# Patient Record
Sex: FEMALE | Race: WHITE | NOT HISPANIC OR LATINO | Employment: FULL TIME | URBAN - METROPOLITAN AREA
[De-identification: names, ages, dates, MRNs, and addresses within clinical notes are randomized per-mention and may not be internally consistent; named-entity substitution may affect disease eponyms.]

---

## 2023-09-15 PROBLEM — G45.9 TIA (TRANSIENT ISCHEMIC ATTACK): Status: ACTIVE | Noted: 2023-09-15

## 2023-09-15 PROBLEM — R53.83 FATIGUE: Status: ACTIVE | Noted: 2023-09-15

## 2023-09-15 PROBLEM — R07.89 CHEST PAIN, ATYPICAL: Status: ACTIVE | Noted: 2023-09-15

## 2023-09-15 PROBLEM — M79.89 LEG SWELLING: Status: ACTIVE | Noted: 2023-09-15

## 2023-09-15 PROBLEM — I25.10 CAD (CORONARY ARTERY DISEASE): Status: ACTIVE | Noted: 2023-09-15

## 2023-09-15 RX ORDER — ASPIRIN 81 MG/1
81 TABLET ORAL DAILY
COMMUNITY

## 2023-09-15 RX ORDER — VALACYCLOVIR HYDROCHLORIDE 500 MG/1
500 TABLET, FILM COATED ORAL
COMMUNITY
End: 2024-04-23 | Stop reason: WASHOUT

## 2023-09-15 RX ORDER — ATORVASTATIN CALCIUM 40 MG/1
40 TABLET, FILM COATED ORAL NIGHTLY
COMMUNITY
End: 2024-04-23 | Stop reason: WASHOUT

## 2023-09-15 RX ORDER — TRAZODONE HYDROCHLORIDE 100 MG/1
100 TABLET ORAL NIGHTLY PRN
COMMUNITY
End: 2024-02-26 | Stop reason: ENTERED-IN-ERROR

## 2023-10-14 PROBLEM — R63.5 WEIGHT GAIN: Status: ACTIVE | Noted: 2023-10-14

## 2023-10-14 PROBLEM — M25.50 JOINT PAIN: Status: ACTIVE | Noted: 2023-10-14

## 2023-10-14 PROBLEM — M48.00 SPINAL STENOSIS: Status: ACTIVE | Noted: 2023-10-14

## 2023-10-14 PROBLEM — T07.XXXA FRACTURES: Status: ACTIVE | Noted: 2023-10-14

## 2023-10-14 PROBLEM — R26.2 DIFFICULTY WALKING: Status: ACTIVE | Noted: 2023-10-14

## 2023-10-14 PROBLEM — M62.81 GENERALIZED MUSCLE WEAKNESS: Status: ACTIVE | Noted: 2023-10-14

## 2023-10-14 PROBLEM — M47.816 DEGENERATIVE JOINT DISEASE (DJD) OF LUMBAR SPINE: Status: ACTIVE | Noted: 2023-10-14

## 2023-10-14 PROBLEM — R53.83 FATIGUE: Status: ACTIVE | Noted: 2023-10-14

## 2023-10-14 PROBLEM — M25.512 LEFT SHOULDER PAIN: Status: ACTIVE | Noted: 2023-10-14

## 2023-10-14 PROBLEM — M79.89 LEG SWELLING: Status: ACTIVE | Noted: 2023-10-14

## 2023-10-14 PROBLEM — R20.0 NUMBNESS: Status: ACTIVE | Noted: 2023-10-14

## 2023-10-14 PROBLEM — S39.012A LUMBAR STRAIN: Status: ACTIVE | Noted: 2023-10-14

## 2023-10-14 RX ORDER — DICLOFENAC POTASSIUM 50 MG/1
TABLET, FILM COATED ORAL
COMMUNITY
Start: 2016-01-26 | End: 2023-10-16

## 2023-10-14 RX ORDER — TRAZODONE HYDROCHLORIDE 150 MG/1
TABLET ORAL
COMMUNITY
Start: 2016-01-20 | End: 2023-10-16

## 2023-10-14 RX ORDER — GABAPENTIN 100 MG/1
1 CAPSULE ORAL 3 TIMES DAILY
COMMUNITY
Start: 2016-01-20 | End: 2023-10-16

## 2023-10-16 ENCOUNTER — LAB (OUTPATIENT)
Dept: LAB | Facility: LAB | Age: 48
End: 2023-10-16
Payer: COMMERCIAL

## 2023-10-16 DIAGNOSIS — I25.10 ATHEROSCLEROTIC HEART DISEASE OF NATIVE CORONARY ARTERY WITHOUT ANGINA PECTORIS: Primary | ICD-10-CM

## 2023-10-16 PROBLEM — G45.9 TRANSIENT ISCHEMIC ATTACK (TIA): Chronic | Status: ACTIVE | Noted: 2023-10-16

## 2023-10-16 PROBLEM — N93.9 ABNORMAL UTERINE BLEEDING (AUB): Status: ACTIVE | Noted: 2023-02-10

## 2023-10-16 PROBLEM — N39.3 SUI (STRESS URINARY INCONTINENCE, FEMALE): Status: ACTIVE | Noted: 2023-02-10

## 2023-10-16 PROBLEM — M51.26 HERNIATED LUMBAR DISC WITHOUT MYELOPATHY: Status: ACTIVE | Noted: 2023-10-16

## 2023-10-16 PROBLEM — G45.9 TRANSIENT ISCHEMIC ATTACK (TIA): Status: ACTIVE | Noted: 2023-10-16

## 2023-10-16 LAB
ALBUMIN SERPL BCP-MCNC: 4.1 G/DL (ref 3.4–5)
ALP SERPL-CCNC: 36 U/L (ref 33–110)
ALT SERPL W P-5'-P-CCNC: 12 U/L (ref 7–45)
ANION GAP SERPL CALC-SCNC: 11 MMOL/L (ref 10–20)
AST SERPL W P-5'-P-CCNC: 12 U/L (ref 9–39)
BILIRUB SERPL-MCNC: 0.4 MG/DL (ref 0–1.2)
BUN SERPL-MCNC: 14 MG/DL (ref 6–23)
CALCIUM SERPL-MCNC: 8.9 MG/DL (ref 8.6–10.3)
CHLORIDE SERPL-SCNC: 105 MMOL/L (ref 98–107)
CHOLEST SERPL-MCNC: 209 MG/DL (ref 0–199)
CHOLESTEROL/HDL RATIO: 2.7
CO2 SERPL-SCNC: 26 MMOL/L (ref 21–32)
CREAT SERPL-MCNC: 0.65 MG/DL (ref 0.5–1.05)
GFR SERPL CREATININE-BSD FRML MDRD: >90 ML/MIN/1.73M*2
GLUCOSE SERPL-MCNC: 91 MG/DL (ref 74–99)
HDLC SERPL-MCNC: 76.8 MG/DL
LDLC SERPL CALC-MCNC: 110 MG/DL
NON HDL CHOLESTEROL: 132 MG/DL (ref 0–149)
POTASSIUM SERPL-SCNC: 3.6 MMOL/L (ref 3.5–5.3)
PROT SERPL-MCNC: 6.6 G/DL (ref 6.4–8.2)
SODIUM SERPL-SCNC: 138 MMOL/L (ref 136–145)
TRIGL SERPL-MCNC: 111 MG/DL (ref 0–149)
VLDL: 22 MG/DL (ref 0–40)

## 2023-10-16 PROCEDURE — 36415 COLL VENOUS BLD VENIPUNCTURE: CPT

## 2023-10-16 PROCEDURE — 80061 LIPID PANEL: CPT

## 2023-10-16 PROCEDURE — 80053 COMPREHEN METABOLIC PANEL: CPT

## 2023-10-16 RX ORDER — IBUPROFEN 600 MG/1
TABLET ORAL
COMMUNITY
Start: 2023-03-27 | End: 2024-02-26 | Stop reason: ENTERED-IN-ERROR

## 2023-10-16 RX ORDER — DULOXETIN HYDROCHLORIDE 30 MG/1
30 CAPSULE, DELAYED RELEASE ORAL
COMMUNITY
Start: 2023-10-10 | End: 2024-04-23 | Stop reason: WASHOUT

## 2023-10-16 RX ORDER — ALBUTEROL SULFATE 90 UG/1
2 AEROSOL, METERED RESPIRATORY (INHALATION) EVERY 6 HOURS PRN
COMMUNITY
Start: 2022-11-01

## 2023-10-16 RX ORDER — UBROGEPANT 100 MG/1
TABLET ORAL
COMMUNITY
Start: 2023-02-13

## 2023-10-16 RX ORDER — DOXYCYCLINE 100 MG/1
100 CAPSULE ORAL 2 TIMES DAILY
COMMUNITY
Start: 2022-11-01 | End: 2024-04-23 | Stop reason: WASHOUT

## 2023-10-16 RX ORDER — TIZANIDINE 2 MG/1
1 TABLET ORAL NIGHTLY PRN
COMMUNITY
Start: 2023-09-12 | End: 2024-04-23 | Stop reason: WASHOUT

## 2023-10-16 RX ORDER — VALACYCLOVIR HYDROCHLORIDE 500 MG/1
500 TABLET, FILM COATED ORAL
COMMUNITY
Start: 2022-12-12

## 2023-10-16 RX ORDER — ATORVASTATIN CALCIUM 40 MG/1
40 TABLET, FILM COATED ORAL NIGHTLY
COMMUNITY
Start: 2023-07-26 | End: 2024-04-23 | Stop reason: WASHOUT

## 2023-10-16 RX ORDER — DULOXETIN HYDROCHLORIDE 60 MG/1
60 CAPSULE, DELAYED RELEASE ORAL
COMMUNITY
Start: 2023-10-10 | End: 2024-04-23 | Stop reason: WASHOUT

## 2023-10-16 RX ORDER — HYDROXYZINE PAMOATE 25 MG/1
25 CAPSULE ORAL 3 TIMES DAILY PRN
COMMUNITY
End: 2024-04-23 | Stop reason: WASHOUT

## 2023-10-16 RX ORDER — METHYLPREDNISOLONE 4 MG/1
TABLET ORAL
COMMUNITY
Start: 2023-08-30 | End: 2024-04-23 | Stop reason: WASHOUT

## 2023-10-16 RX ORDER — DESOXIMETASONE 2.5 MG/G
CREAM TOPICAL
COMMUNITY
Start: 2022-08-28 | End: 2024-04-23 | Stop reason: WASHOUT

## 2023-10-16 RX ORDER — DICLOFENAC SODIUM 75 MG/1
TABLET, DELAYED RELEASE ORAL
COMMUNITY
Start: 2023-10-10 | End: 2024-02-26 | Stop reason: ENTERED-IN-ERROR

## 2023-10-16 RX ORDER — NAPROXEN 500 MG/1
500 TABLET ORAL 2 TIMES DAILY
COMMUNITY
Start: 2023-08-16 | End: 2024-02-26 | Stop reason: ENTERED-IN-ERROR

## 2023-10-16 RX ORDER — OMEPRAZOLE 20 MG/1
20 CAPSULE, DELAYED RELEASE ORAL
COMMUNITY
Start: 2022-12-06 | End: 2024-02-26 | Stop reason: DRUGHIGH

## 2023-10-16 RX ORDER — PREDNISONE 20 MG/1
40 TABLET ORAL
COMMUNITY
Start: 2023-10-10 | End: 2023-10-16

## 2023-10-16 RX ORDER — POLYETHYLENE GLYCOL 3350 17 G/17G
POWDER, FOR SOLUTION ORAL
COMMUNITY
Start: 2023-03-27 | End: 2024-04-23 | Stop reason: WASHOUT

## 2023-10-16 RX ORDER — METHOCARBAMOL 750 MG/1
TABLET, FILM COATED ORAL
COMMUNITY
End: 2024-04-23 | Stop reason: WASHOUT

## 2023-10-16 RX ORDER — TRAZODONE HYDROCHLORIDE 50 MG/1
50 TABLET ORAL NIGHTLY
COMMUNITY
Start: 2023-05-09

## 2023-10-16 RX ORDER — SUMATRIPTAN 50 MG/1
TABLET, FILM COATED ORAL
COMMUNITY
Start: 2022-12-19 | End: 2024-04-23 | Stop reason: WASHOUT

## 2023-10-17 ENCOUNTER — APPOINTMENT (OUTPATIENT)
Dept: CARDIOLOGY | Facility: CLINIC | Age: 48
End: 2023-10-17
Payer: COMMERCIAL

## 2024-02-26 ENCOUNTER — OFFICE VISIT (OUTPATIENT)
Dept: PRIMARY CARE | Facility: CLINIC | Age: 49
End: 2024-02-26
Payer: COMMERCIAL

## 2024-02-26 VITALS
DIASTOLIC BLOOD PRESSURE: 86 MMHG | SYSTOLIC BLOOD PRESSURE: 129 MMHG | WEIGHT: 193.8 LBS | OXYGEN SATURATION: 95 % | BODY MASS INDEX: 33.27 KG/M2 | HEART RATE: 84 BPM

## 2024-02-26 DIAGNOSIS — M25.549 METACARPOPHALANGEAL JOINT PAIN, UNSPECIFIED LATERALITY: ICD-10-CM

## 2024-02-26 DIAGNOSIS — J37.0 CHRONIC LARYNGITIS: Primary | ICD-10-CM

## 2024-02-26 DIAGNOSIS — E66.09 CLASS 1 OBESITY DUE TO EXCESS CALORIES WITHOUT SERIOUS COMORBIDITY WITH BODY MASS INDEX (BMI) OF 33.0 TO 33.9 IN ADULT: ICD-10-CM

## 2024-02-26 PROBLEM — E66.811 CLASS 1 OBESITY DUE TO EXCESS CALORIES WITHOUT SERIOUS COMORBIDITY WITH BODY MASS INDEX (BMI) OF 33.0 TO 33.9 IN ADULT: Status: ACTIVE | Noted: 2024-02-26

## 2024-02-26 PROCEDURE — 99204 OFFICE O/P NEW MOD 45 MIN: CPT | Performed by: FAMILY MEDICINE

## 2024-02-26 PROCEDURE — 1036F TOBACCO NON-USER: CPT | Performed by: FAMILY MEDICINE

## 2024-02-26 PROCEDURE — 3008F BODY MASS INDEX DOCD: CPT | Performed by: FAMILY MEDICINE

## 2024-02-26 RX ORDER — METHOTREXATE 2.5 MG/1
TABLET ORAL
COMMUNITY

## 2024-02-26 RX ORDER — FOLIC ACID 1 MG/1
TABLET ORAL EVERY 24 HOURS
COMMUNITY
Start: 2023-12-11

## 2024-02-26 RX ORDER — MONTELUKAST SODIUM 10 MG/1
10 TABLET ORAL NIGHTLY
Qty: 30 TABLET | Refills: 5 | Status: SHIPPED | OUTPATIENT
Start: 2024-02-26 | End: 2024-08-24

## 2024-02-26 RX ORDER — SEMAGLUTIDE 0.25 MG/.5ML
0.25 INJECTION, SOLUTION SUBCUTANEOUS
Qty: 2 ML | Refills: 0 | Status: SHIPPED
Start: 2024-02-26 | End: 2024-03-25

## 2024-02-26 RX ORDER — HYDROXYCHLOROQUINE SULFATE 200 MG/1
TABLET ORAL EVERY 24 HOURS
COMMUNITY
Start: 2023-12-11 | End: 2024-05-13 | Stop reason: ALTCHOICE

## 2024-02-26 RX ORDER — LORATADINE 10 MG/1
10 TABLET ORAL
COMMUNITY
Start: 2024-01-29

## 2024-02-26 RX ORDER — OMEPRAZOLE 40 MG/1
40 CAPSULE, DELAYED RELEASE ORAL
Qty: 30 CAPSULE | Refills: 2 | Status: SHIPPED | OUTPATIENT
Start: 2024-02-26 | End: 2024-05-26

## 2024-02-26 ASSESSMENT — ENCOUNTER SYMPTOMS
ARTHRALGIAS: 1
ABDOMINAL PAIN: 0
VOICE CHANGE: 1
BACK PAIN: 1
ROS GI COMMENTS: OCCASIONAL HEARTBURN
COUGH: 1
DIARRHEA: 1
DYSPHORIC MOOD: 0
HEADACHES: 0
DIFFICULTY URINATING: 0
SORE THROAT: 1
NERVOUS/ANXIOUS: 0
SINUS PRESSURE: 1
TROUBLE SWALLOWING: 1
CONSTIPATION: 0
NECK STIFFNESS: 1
FATIGUE: 0
SLEEP DISTURBANCE: 1
SHORTNESS OF BREATH: 0
NECK PAIN: 1
UNEXPECTED WEIGHT CHANGE: 1

## 2024-02-26 ASSESSMENT — PATIENT HEALTH QUESTIONNAIRE - PHQ9
SUM OF ALL RESPONSES TO PHQ9 QUESTIONS 1 AND 2: 0
2. FEELING DOWN, DEPRESSED OR HOPELESS: NOT AT ALL
1. LITTLE INTEREST OR PLEASURE IN DOING THINGS: NOT AT ALL

## 2024-02-26 NOTE — ASSESSMENT & PLAN NOTE
Could be due to chronic PND (allergy) as well as laryngeal reflux.  Will trial Singulair, and increase PPI to 40 mg daily, if not improving will refer to allergist.

## 2024-02-26 NOTE — ASSESSMENT & PLAN NOTE
Will write for GLP-1 RA, discussed use, mechanism of action, potential adverse effect.  Follow up in one month

## 2024-02-26 NOTE — PROGRESS NOTES
Subjective   Patient ID: April Dawkins is a 48 y.o. female who presents for Cough and Nasal Congestion.  Cough  Associated symptoms include postnasal drip and a sore throat. Pertinent negatives include no chest pain, headaches or shortness of breath.     April presents to establish care with me.  Her chief complaint is chronic nasal congestion/voice hoarseness for over 2 months.  Has been on 2 courses of antibiotics, oral steroids, nasal steroids without relief.  She is on PPI for acid reflux.  No relief with oral or nasal antihistamine as well.    April also recently diagnosed with RA, on methotrexate, plaquenil.    Review of Systems   Constitutional:  Positive for unexpected weight change (slow weight gain). Negative for fatigue.   HENT:  Positive for congestion, postnasal drip, sinus pressure, sore throat, trouble swallowing and voice change. Negative for hearing loss.    Eyes:  Negative for visual disturbance.   Respiratory:  Positive for cough. Negative for shortness of breath.    Cardiovascular:  Negative for chest pain.   Gastrointestinal:  Positive for diarrhea. Negative for abdominal pain and constipation.        Occasional heartburn   Genitourinary:  Negative for difficulty urinating.   Musculoskeletal:  Positive for arthralgias, back pain, neck pain and neck stiffness.   Neurological:  Negative for headaches.   Psychiatric/Behavioral:  Positive for sleep disturbance. Negative for dysphoric mood. The patient is not nervous/anxious.        Objective   Vitals:    02/26/24 0922   BP: 129/86   Pulse: 84   SpO2: 95%   Weight: 87.9 kg (193 lb 12.8 oz)      Physical Exam  Constitutional:       Appearance: She is obese.   HENT:      Head: Normocephalic and atraumatic.   Eyes:      Extraocular Movements: Extraocular movements intact.      Pupils: Pupils are equal, round, and reactive to light.   Cardiovascular:      Rate and Rhythm: Normal rate and regular rhythm.   Pulmonary:      Effort: Pulmonary effort is  normal.      Breath sounds: Normal breath sounds.   Abdominal:      General: There is no distension.      Tenderness: There is no abdominal tenderness.   Musculoskeletal:      Right hand: Tenderness and bony tenderness present.      Left hand: Tenderness and bony tenderness present.      Cervical back: Normal range of motion and neck supple.      Comments: Has significant arthritic pain, TTP to base of thumbs bilaterally   Neurological:      General: No focal deficit present.      Mental Status: She is alert and oriented to person, place, and time.   Psychiatric:         Mood and Affect: Mood normal.         Behavior: Behavior normal.         Assessment/Plan   There are no diagnoses linked to this encounter.    Problem List Items Addressed This Visit             ICD-10-CM    Chronic laryngitis - Primary J37.0     Could be due to chronic PND (allergy) as well as laryngeal reflux.  Will trial Singulair, and increase PPI to 40 mg daily, if not improving will refer to allergist.         Relevant Medications    montelukast (Singulair) 10 mg tablet    omeprazole (PriLOSEC) 40 mg DR capsule    Other Relevant Orders    Referral to Allergy    Follow Up In Primary Care - Established    Class 1 obesity due to excess calories without serious comorbidity with body mass index (BMI) of 33.0 to 33.9 in adult E66.09, Z68.33     Will write for GLP-1 RA, discussed use, mechanism of action, potential adverse effect.  Follow up in one month         Relevant Medications    semaglutide, weight loss, (Wegovy) 0.25 mg/0.5 mL pen injector    Other Relevant Orders    Follow Up In Primary Care - Established    Metacarpophalangeal joint pain M25.549     Has already had multiple injections.  Will refer to Dr. Ambriz for surgical options.         Relevant Orders    Referral to Orthopaedic Surgery

## 2024-03-04 ENCOUNTER — HOSPITAL ENCOUNTER (OUTPATIENT)
Dept: RADIOLOGY | Facility: CLINIC | Age: 49
Discharge: HOME | End: 2024-03-04
Payer: COMMERCIAL

## 2024-03-04 ENCOUNTER — OFFICE VISIT (OUTPATIENT)
Dept: ORTHOPEDIC SURGERY | Facility: CLINIC | Age: 49
End: 2024-03-04
Payer: COMMERCIAL

## 2024-03-04 DIAGNOSIS — M19.049 CMC ARTHRITIS: Primary | ICD-10-CM

## 2024-03-04 DIAGNOSIS — M25.549 METACARPOPHALANGEAL JOINT PAIN, UNSPECIFIED LATERALITY: ICD-10-CM

## 2024-03-04 PROBLEM — Z01.818 PRE-OP EXAM: Status: ACTIVE | Noted: 2024-03-04

## 2024-03-04 PROBLEM — M18.12 UNILATERAL PRIMARY OSTEOARTHRITIS OF FIRST CARPOMETACARPAL JOINT, LEFT HAND: Status: ACTIVE | Noted: 2024-03-04

## 2024-03-04 PROCEDURE — 73140 X-RAY EXAM OF FINGER(S): CPT | Mod: LT

## 2024-03-04 PROCEDURE — 73140 X-RAY EXAM OF FINGER(S): CPT | Mod: LEFT SIDE | Performed by: RADIOLOGY

## 2024-03-04 PROCEDURE — 1036F TOBACCO NON-USER: CPT | Performed by: ORTHOPAEDIC SURGERY

## 2024-03-04 PROCEDURE — 3008F BODY MASS INDEX DOCD: CPT | Performed by: ORTHOPAEDIC SURGERY

## 2024-03-04 PROCEDURE — 99214 OFFICE O/P EST MOD 30 MIN: CPT | Performed by: ORTHOPAEDIC SURGERY

## 2024-03-04 NOTE — PROGRESS NOTES
3/4/2024    Chief Complaint   Patient presents with    Left Hand - New Patient Visit, Pain     Lt thumb cmc       History of Present Illness:  Patient April Dawkins , 48 y.o. female, presents today, 3/4/2024, for evaluation of left thumb  CMC arthritis .  This has been an ongoing problem for about 2 years.  Patient has undergone a series of 4 injections in the past, the most recent one was 2 weeks ago when she states she got no symptomatic relief from this.  She has difficulties with pinch and , swelling and pain localized to the base of the left thumb.  She works as a  and  and this is causing her functional compromise.  She is right-hand dominant individual.  No discrete injury or trauma associated with symptom onset.  She has history of rheumatoid arthritis that is treated with methotrexate, folic acid, Plaquenil.       Review of Systems:   GENERAL: Negative  GI: Negative  MUSCULOSKELETAL: See HPI  SKIN: Negative  NEURO:  Negative     Physical Exam:  GENERAL:  Alert and oriented to person, place, and time.  No acute distress and breathing comfortably; pleasant and cooperative with the examination.  HEENT:  Head is normocephalic and atraumatic.  NECK:  Supple, no visible swelling.  CARDIOVASCULAR:  No palpable tachycardia.  LUNGS:  No audible wheezing or labored breathing.  ABDOMEN:  Nondistended.  Extremities: Evaluation of left upper extremity finds the patient to have a palpable radial artery at the wrist with brisk capillary refill to all digits. The patient has intact sensorium to axillary, radial, median and ulnar nerves. There are no open wounds. There are no signs of infection. There is no evidence of lymphedema or lymphatic streaking. The patient has supple compartments of the left arm, forearm and hand.  Substantial tenderness over the left thumb CMC articulation with positive basilar grind test on exam.  She has trouble flexing the thumb to even the tip of the digit of the left  small finger.  Pain with resisted thumb flexion and extension.     Imaging/Test Results:  3 views of the left thumb show no acute fracture or dislocation.  There is substantial arthritic change with loss of joint space height, narrowing, osteophyte formation of the left thumb CMC articulation.     Assessment:  Left thumb CMC arthritis recalcitrant to nonoperative treatment strategies.     Plan:  Operative and nonoperative treatment strategies were discussed at length.  Patient has failed conservative treatment strategies including multiple intra-articular injections and cool comfort splinting.  She is eager to pursue surgical intervention. A long discussion with the patient regarding left thumb CMC arthroplasty with possible tendon transfer.  At this point, I discussed the risks/benefits/expected outcomes of observation versus surgery.  The risks/benefits of surgery were reviewed. The risks include, but are not limited to, infection, bleeding, nerve/vessel injury, stiffness, arthritis and anaesthetic complications. I have answered all questions regarding the surgery itself and the post-operative course. The patient consented to surgery.    In a face to face encounter, I performed a history and physical examination, discussed pertinent diagnostic studies if indicated, and discussed diagnosis and management strategies with both the patient and the mid-level provider. I reviewed the mid-level's note and agree with the documented findings and plan of care.  Patient presents today for evaluation of painful left thumb CMC arthritis.  She has history of rheumatoid arthritis.  She takes Plaquenil and methotrexate.  Previous steroid injection only brought about minimal relief.  X-rays show severe arthritic change.  We talked about operative and nonoperative strategies for left thumb CMC arthritis.  We talked about intraoperative techniques and postoperative protocols.  Patient elects to book for surgery for left thumb CMC  arthroplasty.  We will have the Arthrex mini tight rope on hold but not necessarily use it.  She does not need to alter her rheumatoid arthritis drugs in the interim before or after surgery.

## 2024-03-25 ENCOUNTER — OFFICE VISIT (OUTPATIENT)
Dept: PRIMARY CARE | Facility: CLINIC | Age: 49
End: 2024-03-25
Payer: COMMERCIAL

## 2024-03-25 VITALS
DIASTOLIC BLOOD PRESSURE: 96 MMHG | HEART RATE: 100 BPM | OXYGEN SATURATION: 93 % | WEIGHT: 192.8 LBS | HEIGHT: 64 IN | SYSTOLIC BLOOD PRESSURE: 147 MMHG | BODY MASS INDEX: 32.91 KG/M2

## 2024-03-25 DIAGNOSIS — E66.09 CLASS 1 OBESITY DUE TO EXCESS CALORIES WITHOUT SERIOUS COMORBIDITY WITH BODY MASS INDEX (BMI) OF 33.0 TO 33.9 IN ADULT: Primary | ICD-10-CM

## 2024-03-25 DIAGNOSIS — J37.0 CHRONIC LARYNGITIS: ICD-10-CM

## 2024-03-25 PROCEDURE — 99213 OFFICE O/P EST LOW 20 MIN: CPT | Performed by: FAMILY MEDICINE

## 2024-03-25 PROCEDURE — 1036F TOBACCO NON-USER: CPT | Performed by: FAMILY MEDICINE

## 2024-03-25 PROCEDURE — 3008F BODY MASS INDEX DOCD: CPT | Performed by: FAMILY MEDICINE

## 2024-03-25 RX ORDER — PHENTERMINE HYDROCHLORIDE 37.5 MG/1
37.5 CAPSULE ORAL
Qty: 30 CAPSULE | Refills: 0 | Status: SHIPPED | OUTPATIENT
Start: 2024-03-25 | End: 2024-04-29 | Stop reason: SDUPTHER

## 2024-03-25 ASSESSMENT — ENCOUNTER SYMPTOMS
PALPITATIONS: 0
SLEEP DISTURBANCE: 1
SORE THROAT: 1
VOICE CHANGE: 1
FEVER: 0
ABDOMINAL PAIN: 0

## 2024-03-25 ASSESSMENT — PATIENT HEALTH QUESTIONNAIRE - PHQ9
2. FEELING DOWN, DEPRESSED OR HOPELESS: NOT AT ALL
SUM OF ALL RESPONSES TO PHQ9 QUESTIONS 1 AND 2: 0
1. LITTLE INTEREST OR PLEASURE IN DOING THINGS: NOT AT ALL

## 2024-03-25 NOTE — H&P (VIEW-ONLY)
Subjective   Patient ID: April Dawkins is a 48 y.o. female who presents for Obesity (PT is here to establish a weight loss plan).  HPI  April presents for discussion of weight loss.  She cannot pay $1100/month for GLP-1 RA.    She is still having hoarseness, no change with Singulair or PPI.    OARRS:  No data recorded  I have personally reviewed the OARRS report for April Dawkins. I have considered the risks of abuse, dependence, addiction and diversion and I believe that it is clinically appropriate for April Dawkins to be prescribed this medication    Is the patient prescribed a combination of a benzodiazepine and opioid?  No    Last Urine Drug Screen / ordered today: No  No results found for this or any previous visit (from the past 8760 hour(s)).  N/A    Clinical rationale for not completing a Urine Drug Screen: Would complete at next visit      Controlled Substance Agreement:  Date of the Last Agreement: 3/25/24  Reviewed Controlled Substance Agreement including but not limited to the benefits, risks, and alternatives to treatment with a Controlled Substance medication(s).    Anorexiants:   What is the patient's goal of therapy? Lose weight    I have assessed the patient's continuing efforts to lose weight., I have assessed the patient's dedication to the treatment program and the response to treatment., and I have assessed the presence or absence of contraindications, adverse effects, and indicators of possible substance abuse that would necessitate cessation of treatment utilizing controlled substance.      Review of Systems   Constitutional:  Negative for fever.   HENT:  Positive for sore throat and voice change.    Cardiovascular:  Negative for chest pain and palpitations.   Gastrointestinal:  Negative for abdominal pain.   Psychiatric/Behavioral:  Positive for sleep disturbance.        Objective   Vitals:    03/25/24 0910   BP: (!) 147/96   Pulse: 100   SpO2: 93%   Weight: 87.5 kg (192 lb 12.8  "oz)   Height: 1.626 m (5' 4\")      Physical Exam  Constitutional:       Appearance: She is obese.   HENT:      Head: Normocephalic and atraumatic.   Eyes:      Extraocular Movements: Extraocular movements intact.      Pupils: Pupils are equal, round, and reactive to light.   Cardiovascular:      Rate and Rhythm: Normal rate and regular rhythm.   Pulmonary:      Effort: Pulmonary effort is normal.      Breath sounds: Normal breath sounds.   Abdominal:      General: There is no distension.      Tenderness: There is no abdominal tenderness.   Musculoskeletal:      Cervical back: Normal range of motion and neck supple.   Neurological:      General: No focal deficit present.      Mental Status: She is alert and oriented to person, place, and time.   Psychiatric:         Mood and Affect: Mood normal.         Behavior: Behavior normal.         Assessment/Plan   Diagnoses and all orders for this visit:  Chronic laryngitis  -     Follow Up In Primary Care - Established  Class 1 obesity due to excess calories without serious comorbidity with body mass index (BMI) of 33.0 to 33.9 in adult  -     Follow Up In Primary Care - Established      Problem List Items Addressed This Visit             ICD-10-CM    Chronic laryngitis J37.0     No improvement with use of PPI.  Has appointment with ENT later today         Class 1 obesity due to excess calories without serious comorbidity with body mass index (BMI) of 33.0 to 33.9 in adult - Primary E66.09, Z68.33     Will trial Phentermine.  Discussed use, potential risks.  Signed CSA, plan follow up in one month          "

## 2024-04-02 NOTE — DISCHARGE INSTRUCTIONS
Medication given may have significant effects after discharge. Therefore on the day of surgery:  1) You must be accompanied by a responsible adult upon discharge and for 24 hours after surgery.  Do not drive a motor vehicle, operate machinery, power tools or appliance, drink alcoholic beverages, or make critical decisions for 24 hours.  2) Be aware of dizziness, which may cause a fall. Change positions slowly.  3) Eating: you may resume your regular diet but it is better to increase intake slowly with mild foods and working up to your regular diet. No greasy, fried or spicy foods today.  4) Nausea/Vomiting: Nausea and vomiting may occur as you become more active or begin to increase food intake. If this should happen, decrease activity and return to liquids. If the problem persists, call your surgeon  5) Pain: Your surgeon may have given you a prescription for pain medication. Take pain medication with food as prescribed. Pain medication may cause constipation, so drink plenty of fluids. If your pain medication does not provide adequate relief, call your surgeon  6) Urinating: Notify your surgeon if you have not urinated within 12 hours after discharge  7) Ice: Apply ice to operative site for 20 min 5-6 times a day or use Polar care as instructed  8) Dressing:   []  Remove dressing in 3 Days   []  Leave open to air or apply simple Band-Aid after initial dressing is taken off and incision is dry. (If Steri-Strips are applied, leave them in place.)   []  No baths, hots tubs, pools, or submerging in fresh water sources. Okay to begin showering and normal hand washing after dressing removal.     [x]  Leave dressing in place. Keep dressing/ incision clean and dry.      9) Activity:    Shoulder/ Elbow/Hand:   [x]  Elevate extremity    [x]  Sling   [] At all times (except for exercises and showering)  [x] As needed only for comfort   [x] Begin daily motion exercises out of sling as instructed   [x]  Bend and flex  fingers/wrist/elbow frequently   [x] Non-weight bearing/No lifting/gripping/squeezing to the surgical limb   [] No lifting greater than 1 lb until follow-up visit      10) Begin physical therapy if advised by your physician:   [] Before returning to see you doctor    [] Will discuss possible need at follow-up visit   [x] Will be paired with your follow-up visit in [place]    11) Call your doctor at [phone] for an appointment (or follow up as scheduled)    Contact [facility] office if  Increased redness, swelling, drainage of any kind, and/or pain to surgery site.  As well as new onset fevers and or chills.  These could signify an infection.  Calf or thigh tenderness to touch as well as increased swelling or redness.  This could signify a clot formation.  Numbness or tingling to an area around the incision site or below the incision site (toes). Or if the operative extremity becomes cold, blue.  Any rash appears, increased  or new onset nausea/vomiting occur.  This may indicate a reaction to a medication.  Temp is 38.5 C (101F)  12) If you have any concerns or questions, please call [facility] surgeon on call. The 24- hour phone is [phone]  13) If you are unable to contact your surgeon, in an emergency situation, go to the nearest hospital     No

## 2024-04-09 RX ORDER — OXYCODONE AND ACETAMINOPHEN 5; 325 MG/1; MG/1
1 TABLET ORAL EVERY 8 HOURS PRN
Qty: 20 TABLET | Refills: 0 | Status: SHIPPED | OUTPATIENT
Start: 2024-04-09 | End: 2024-04-16

## 2024-04-10 ENCOUNTER — APPOINTMENT (OUTPATIENT)
Dept: RADIOLOGY | Facility: HOSPITAL | Age: 49
End: 2024-04-10
Payer: COMMERCIAL

## 2024-04-10 ENCOUNTER — HOSPITAL ENCOUNTER (OUTPATIENT)
Facility: HOSPITAL | Age: 49
Setting detail: OUTPATIENT SURGERY
Discharge: HOME | End: 2024-04-10
Attending: ORTHOPAEDIC SURGERY | Admitting: ORTHOPAEDIC SURGERY
Payer: COMMERCIAL

## 2024-04-10 ENCOUNTER — ANESTHESIA (OUTPATIENT)
Dept: OPERATING ROOM | Facility: HOSPITAL | Age: 49
End: 2024-04-10
Payer: COMMERCIAL

## 2024-04-10 ENCOUNTER — ANESTHESIA EVENT (OUTPATIENT)
Dept: OPERATING ROOM | Facility: HOSPITAL | Age: 49
End: 2024-04-10
Payer: COMMERCIAL

## 2024-04-10 VITALS
RESPIRATION RATE: 18 BRPM | SYSTOLIC BLOOD PRESSURE: 137 MMHG | OXYGEN SATURATION: 98 % | HEART RATE: 82 BPM | TEMPERATURE: 96.8 F | DIASTOLIC BLOOD PRESSURE: 82 MMHG

## 2024-04-10 DIAGNOSIS — M18.12 UNILATERAL PRIMARY OSTEOARTHRITIS OF FIRST CARPOMETACARPAL JOINT, LEFT HAND: ICD-10-CM

## 2024-04-10 DIAGNOSIS — Z01.818 PRE-OP EXAM: ICD-10-CM

## 2024-04-10 DIAGNOSIS — G89.18 POST-OP PAIN: Primary | ICD-10-CM

## 2024-04-10 LAB — HCG UR QL IA.RAPID: NEGATIVE

## 2024-04-10 PROCEDURE — 25310 TRANSPLANT FOREARM TENDON: CPT | Performed by: ORTHOPAEDIC SURGERY

## 2024-04-10 PROCEDURE — 25447 ARTHRP NTRCRP/CRP/MTCR NTRPS: CPT | Performed by: ORTHOPAEDIC SURGERY

## 2024-04-10 PROCEDURE — A25447 PR REPAIR INTERCARP/CARP-METACARP JT: Performed by: ANESTHESIOLOGY

## 2024-04-10 PROCEDURE — 25310 TRANSPLANT FOREARM TENDON: CPT | Performed by: PHYSICIAN ASSISTANT

## 2024-04-10 PROCEDURE — 81025 URINE PREGNANCY TEST: CPT | Performed by: ORTHOPAEDIC SURGERY

## 2024-04-10 PROCEDURE — 25447 ARTHRP NTRCRP/CRP/MTCR NTRPS: CPT | Performed by: PHYSICIAN ASSISTANT

## 2024-04-10 PROCEDURE — A25447 PR REPAIR INTERCARP/CARP-METACARP JT: Performed by: NURSE ANESTHETIST, CERTIFIED REGISTERED

## 2024-04-10 PROCEDURE — 2720000007 HC OR 272 NO HCPCS: Performed by: ORTHOPAEDIC SURGERY

## 2024-04-10 PROCEDURE — 7100000002 HC RECOVERY ROOM TIME - EACH INCREMENTAL 1 MINUTE: Performed by: ORTHOPAEDIC SURGERY

## 2024-04-10 PROCEDURE — 3600000009 HC OR TIME - EACH INCREMENTAL 1 MINUTE - PROCEDURE LEVEL FOUR: Performed by: ORTHOPAEDIC SURGERY

## 2024-04-10 PROCEDURE — 7100000001 HC RECOVERY ROOM TIME - INITIAL BASE CHARGE: Performed by: ORTHOPAEDIC SURGERY

## 2024-04-10 PROCEDURE — 3700000001 HC GENERAL ANESTHESIA TIME - INITIAL BASE CHARGE: Performed by: ORTHOPAEDIC SURGERY

## 2024-04-10 PROCEDURE — 7100000010 HC PHASE TWO TIME - EACH INCREMENTAL 1 MINUTE: Performed by: ORTHOPAEDIC SURGERY

## 2024-04-10 PROCEDURE — 64417 NJX AA&/STRD AX NERVE IMG: CPT | Performed by: ANESTHESIOLOGY

## 2024-04-10 PROCEDURE — 2500000004 HC RX 250 GENERAL PHARMACY W/ HCPCS (ALT 636 FOR OP/ED): Mod: JZ | Performed by: PHYSICIAN ASSISTANT

## 2024-04-10 PROCEDURE — 3700000002 HC GENERAL ANESTHESIA TIME - EACH INCREMENTAL 1 MINUTE: Performed by: ORTHOPAEDIC SURGERY

## 2024-04-10 PROCEDURE — 3600000004 HC OR TIME - INITIAL BASE CHARGE - PROCEDURE LEVEL FOUR: Performed by: ORTHOPAEDIC SURGERY

## 2024-04-10 PROCEDURE — 7100000009 HC PHASE TWO TIME - INITIAL BASE CHARGE: Performed by: ORTHOPAEDIC SURGERY

## 2024-04-10 PROCEDURE — 2500000004 HC RX 250 GENERAL PHARMACY W/ HCPCS (ALT 636 FOR OP/ED): Performed by: NURSE ANESTHETIST, CERTIFIED REGISTERED

## 2024-04-10 PROCEDURE — 2500000004 HC RX 250 GENERAL PHARMACY W/ HCPCS (ALT 636 FOR OP/ED): Performed by: ANESTHESIOLOGY

## 2024-04-10 RX ORDER — ALBUTEROL SULFATE 0.83 MG/ML
2.5 SOLUTION RESPIRATORY (INHALATION) ONCE AS NEEDED
Status: DISCONTINUED | OUTPATIENT
Start: 2024-04-10 | End: 2024-04-10 | Stop reason: HOSPADM

## 2024-04-10 RX ORDER — OXYCODONE AND ACETAMINOPHEN 5; 325 MG/1; MG/1
1 TABLET ORAL EVERY 4 HOURS PRN
Status: DISCONTINUED | OUTPATIENT
Start: 2024-04-10 | End: 2024-04-10 | Stop reason: HOSPADM

## 2024-04-10 RX ORDER — CEFAZOLIN SODIUM 2 G/100ML
2 INJECTION, SOLUTION INTRAVENOUS ONCE
Status: COMPLETED | OUTPATIENT
Start: 2024-04-10 | End: 2024-04-10

## 2024-04-10 RX ORDER — DIPHENHYDRAMINE HYDROCHLORIDE 50 MG/ML
12.5 INJECTION INTRAMUSCULAR; INTRAVENOUS ONCE AS NEEDED
Status: DISCONTINUED | OUTPATIENT
Start: 2024-04-10 | End: 2024-04-10 | Stop reason: HOSPADM

## 2024-04-10 RX ORDER — SODIUM CHLORIDE, SODIUM LACTATE, POTASSIUM CHLORIDE, CALCIUM CHLORIDE 600; 310; 30; 20 MG/100ML; MG/100ML; MG/100ML; MG/100ML
100 INJECTION, SOLUTION INTRAVENOUS CONTINUOUS
Status: DISCONTINUED | OUTPATIENT
Start: 2024-04-10 | End: 2024-04-10 | Stop reason: HOSPADM

## 2024-04-10 RX ORDER — LABETALOL HYDROCHLORIDE 5 MG/ML
5 INJECTION, SOLUTION INTRAVENOUS ONCE AS NEEDED
Status: DISCONTINUED | OUTPATIENT
Start: 2024-04-10 | End: 2024-04-10 | Stop reason: HOSPADM

## 2024-04-10 RX ORDER — HYDRALAZINE HYDROCHLORIDE 20 MG/ML
5 INJECTION INTRAMUSCULAR; INTRAVENOUS EVERY 30 MIN PRN
Status: DISCONTINUED | OUTPATIENT
Start: 2024-04-10 | End: 2024-04-10 | Stop reason: HOSPADM

## 2024-04-10 RX ORDER — ONDANSETRON HYDROCHLORIDE 2 MG/ML
4 INJECTION, SOLUTION INTRAVENOUS ONCE AS NEEDED
Status: DISCONTINUED | OUTPATIENT
Start: 2024-04-10 | End: 2024-04-10 | Stop reason: HOSPADM

## 2024-04-10 RX ORDER — LIDOCAINE HYDROCHLORIDE 10 MG/ML
0.1 INJECTION, SOLUTION EPIDURAL; INFILTRATION; INTRACAUDAL; PERINEURAL ONCE
Status: DISCONTINUED | OUTPATIENT
Start: 2024-04-10 | End: 2024-04-10 | Stop reason: HOSPADM

## 2024-04-10 RX ORDER — PROPOFOL 10 MG/ML
INJECTION, EMULSION INTRAVENOUS AS NEEDED
Status: DISCONTINUED | OUTPATIENT
Start: 2024-04-10 | End: 2024-04-10

## 2024-04-10 RX ORDER — ACETAMINOPHEN 325 MG/1
975 TABLET ORAL ONCE
Status: DISCONTINUED | OUTPATIENT
Start: 2024-04-10 | End: 2024-04-10 | Stop reason: HOSPADM

## 2024-04-10 RX ORDER — OXYCODONE HYDROCHLORIDE 5 MG/1
5 TABLET ORAL EVERY 4 HOURS PRN
Status: DISCONTINUED | OUTPATIENT
Start: 2024-04-10 | End: 2024-04-10 | Stop reason: HOSPADM

## 2024-04-10 RX ORDER — MIDAZOLAM HYDROCHLORIDE 1 MG/ML
INJECTION, SOLUTION INTRAMUSCULAR; INTRAVENOUS AS NEEDED
Status: DISCONTINUED | OUTPATIENT
Start: 2024-04-10 | End: 2024-04-10

## 2024-04-10 RX ADMIN — PROPOFOL 50 MG: 10 INJECTION, EMULSION INTRAVENOUS at 10:05

## 2024-04-10 RX ADMIN — HYDRALAZINE HYDROCHLORIDE 5 MG: 20 INJECTION INTRAMUSCULAR; INTRAVENOUS at 11:02

## 2024-04-10 RX ADMIN — PROPOFOL 30 MG: 10 INJECTION, EMULSION INTRAVENOUS at 09:59

## 2024-04-10 RX ADMIN — CEFAZOLIN SODIUM 2 G: 2 INJECTION, SOLUTION INTRAVENOUS at 09:56

## 2024-04-10 RX ADMIN — PROPOFOL 50 MG: 10 INJECTION, EMULSION INTRAVENOUS at 10:23

## 2024-04-10 RX ADMIN — PROPOFOL 50 MG: 10 INJECTION, EMULSION INTRAVENOUS at 10:12

## 2024-04-10 RX ADMIN — PROPOFOL 50 MG: 10 INJECTION, EMULSION INTRAVENOUS at 10:15

## 2024-04-10 RX ADMIN — PROPOFOL 80 MG: 10 INJECTION, EMULSION INTRAVENOUS at 09:46

## 2024-04-10 RX ADMIN — PROPOFOL 50 MG: 10 INJECTION, EMULSION INTRAVENOUS at 10:08

## 2024-04-10 RX ADMIN — PROPOFOL 50 MG: 10 INJECTION, EMULSION INTRAVENOUS at 09:49

## 2024-04-10 RX ADMIN — PROPOFOL 50 MG: 10 INJECTION, EMULSION INTRAVENOUS at 10:01

## 2024-04-10 RX ADMIN — SODIUM CHLORIDE, POTASSIUM CHLORIDE, SODIUM LACTATE AND CALCIUM CHLORIDE: 600; 310; 30; 20 INJECTION, SOLUTION INTRAVENOUS at 09:44

## 2024-04-10 RX ADMIN — PROPOFOL 50 MG: 10 INJECTION, EMULSION INTRAVENOUS at 10:26

## 2024-04-10 RX ADMIN — PROPOFOL 50 MG: 10 INJECTION, EMULSION INTRAVENOUS at 09:57

## 2024-04-10 RX ADMIN — PROPOFOL 50 MG: 10 INJECTION, EMULSION INTRAVENOUS at 09:53

## 2024-04-10 RX ADMIN — PROPOFOL 50 MG: 10 INJECTION, EMULSION INTRAVENOUS at 10:19

## 2024-04-10 RX ADMIN — MIDAZOLAM 2 MG: 1 INJECTION INTRAMUSCULAR; INTRAVENOUS at 09:33

## 2024-04-10 RX ADMIN — HYDROMORPHONE HYDROCHLORIDE 0.5 MG: 1 INJECTION, SOLUTION INTRAMUSCULAR; INTRAVENOUS; SUBCUTANEOUS at 10:41

## 2024-04-10 SDOH — HEALTH STABILITY: MENTAL HEALTH: CURRENT SMOKER: 0

## 2024-04-10 ASSESSMENT — PAIN SCALES - GENERAL
PAINLEVEL_OUTOF10: 0 - NO PAIN
PAINLEVEL_OUTOF10: 4
PAINLEVEL_OUTOF10: 0 - NO PAIN
PAINLEVEL_OUTOF10: 7
PAINLEVEL_OUTOF10: 1
PAINLEVEL_OUTOF10: 0 - NO PAIN

## 2024-04-10 ASSESSMENT — PAIN - FUNCTIONAL ASSESSMENT
PAIN_FUNCTIONAL_ASSESSMENT: 0-10

## 2024-04-10 ASSESSMENT — COLUMBIA-SUICIDE SEVERITY RATING SCALE - C-SSRS
6. HAVE YOU EVER DONE ANYTHING, STARTED TO DO ANYTHING, OR PREPARED TO DO ANYTHING TO END YOUR LIFE?: NO
2. HAVE YOU ACTUALLY HAD ANY THOUGHTS OF KILLING YOURSELF?: NO
1. IN THE PAST MONTH, HAVE YOU WISHED YOU WERE DEAD OR WISHED YOU COULD GO TO SLEEP AND NOT WAKE UP?: NO

## 2024-04-10 ASSESSMENT — PAIN DESCRIPTION - DESCRIPTORS: DESCRIPTORS: ACHING;SORE;SHOOTING

## 2024-04-10 NOTE — ANESTHESIA PROCEDURE NOTES
Peripheral Block    Start time: 4/10/2024 9:15 AM  End time: 4/10/2024 9:17 AM  Reason for block: at surgeon's request and post-op pain management  Staffing  Performed: attending   Authorized by: Hesham Spivey MD    Performed by: Hesham Spivey MD  Preanesthetic Checklist  Completed: patient identified, IV checked, site marked, risks and benefits discussed, surgical consent, monitors and equipment checked, pre-op evaluation and timeout performed   Timeout performed at: 4/10/2024 9:14 AM  Peripheral Block  Patient position: laying flat  Prep: ChloraPrep  Patient monitoring: continuous pulse ox  Block type: axillary  Laterality: left  Injection technique: single-shot  Guidance: ultrasound guided  Local infiltration: bupivicaine  Infiltration strength: 0.5 %  Dose: 30 mL  Needle  Needle type: short-bevel   Needle gauge: 21 G  Needle length: 8 cm  Needle localization: ultrasound guidance  Assessment  Injection assessment: negative aspiration for heme, no paresthesia on injection, incremental injection and local visualized surrounding nerve on ultrasound

## 2024-04-10 NOTE — OP NOTE
LEFT THUMB CARPOMETACARPAL ARTHROPLASTY WITH POSSIBLE TENDON TRANSFER (L) Operative Note     Date: 4/10/2024  OR Location: [facility] OR    Name: April Dawkins, : 1975, Age: 48 y.o., MRN: [id number], Sex: female      Preoperative diagnosis: Left thumb CMC osteoarthritis.  Left hand first webspace contracture.    Postoperative diagnosis: Left thumb CMC osteoarthritis.  Left hand first webspace contracture.    Procedure planned: Left thumb CMC arthroplasty with end to side tendon transfer of abductor pollicis longus to flexor carpi radialis.    Procedure performed: Left thumb CMC arthroplasty with end to side tendon transfer of abductor pollicis longus to flexor carpi radialis.    Surgeon: Erlin Ambriz D.O.    Assistant: MAY Peter  The physician assistant was present to the entire case. Given the nature of the disease process and the procedure to be performed a skilled surgical assistant was necessary during the case. The assistant was necessary in order to hold retractors and directly assist in the operation. A certified scrub tech was at the back table managing instruments and supplies for the surgical case.    Anesthesia: General    Estimated blood loss: Less than 20 mL    Drains: None    Tourniquet: Approximately 30 minutes at 250 mmHg to the well-padded upper arm    Specimens: None    Implants: None    Indications: Patient presents to the office with painful arthritis affecting left thumb CMC articulation.  The patient experienced failure of nonoperative treatment strategies.  After full discussion regarding risks benefits and alternatives the patient elected to forego any additional nonoperative measures in favor of left thumb CMC arthroplasty with possible tendon transfer.  Informed consent was signed and placed in the chart.    Complications: None noted at the time of surgery    Description of operation: The patient was taken to the operative suite and placed in the supine position on the operating  table.  A timeout was performed and the left hand was confirmed to be the operative site.  The patient was carefully positioned on the table in such a fashion as to pad all bony prominences and peripheral nerves.  The patient was administered appropriate IV antibiotics and general anesthesia.  The patient was then prepped and draped in the normal sterile fashion.  After prepping and draping an apex volar chevron shaped incision was marked out over the base of the thumb centered over the CMC articulation.  A 1 cm incision was marked out over the metaphyseal diaphyseal junction of the dorsal second metacarpal slightly ulnar to midline.  A sterile Esmarch was then used to exsanguinate the upper extremity and tourniquet was raised to 250 mmHg. The 15 blade was then used to incise skin over the base of the thumb.  Tenotomy scissors were used to gently dissect down to the subcutaneous plane, taking care to identify and protect the dorsal sensory branches of the radial nerve.  The sensory branches were protected through the remainder of the case.  We then developed the interval between the extensor pollicis brevis and abductor pollicis longus.  With the tendons retracted we created a full-thickness capsular incision down to bone directly overlying the CMC articulation.  Sharp dissection was used to release the soft tissue attachments to the trapezium.  An osteotome was then used to quarter the trapezium and the trapezium fragments were then removed in a piecemeal fashion with the Rongeur.  Care was taken to avoid iatrogenic injury to the underlying FCR tendon and capsular structures.  2-0 Ethibond suture was then used to perform end to side tendon transfer of flexor carpi radialis to abductor pollicis longus.  Care was taken to take bites within the FCR tendon close to its insertion site.  A sling was created by way of a grasping stitch within the abductor pollicis longus suturing it to the FCR tendon.  Not only did this  provide a sling that secured axial length of the construct but it also allowed the metacarpal to be positioned in greater abduction and extension, a more functional position.  The tourniquet was deflated.  Meticulous hemostasis was achieved.  Vital stitches were used to close the capsular layer over the CMC articulation.  Interrupted nylon stitches were used to close the skin.  The patient was then placed into a nonadherent dressing and thumb spica short arm plaster splint.  The patient was then allowed to arise from general anesthesia and taken to recovery in stable condition.  Overall the patient tolerated the procedure well.    Disposition: Stable to PACU        Erlin Ambriz  Phone Number: [phone]

## 2024-04-10 NOTE — ANESTHESIA PREPROCEDURE EVALUATION
Patient: April Dawkins    Procedure Information       Date/Time: 04/10/24 0920    Procedure: LEFT THUMB CARPOMETACARPAL ARTHROPLASTY WITH POSSIBLE TENDON TRANSFER (Left: Thumb)    Location: [facility] OR 05 / Virtual [facility]    Surgeons: Erlin Ambriz, DO            Relevant Problems   Cardiac   (+) CAD (coronary artery disease)   (+) Chest pain, atypical   (+) Coronary artery disease   (+) HLD (hyperlipidemia)      Endocrine   (+) Class 1 obesity due to excess calories without serious comorbidity with body mass index (BMI) of 33.0 to 33.9 in adult      Musculoskeletal   (+) Degenerative joint disease (DJD) of lumbar spine   (+) Herniated lumbar disc without myelopathy   (+) Spinal stenosis   (+) Unilateral primary osteoarthritis of first carpometacarpal joint, left hand      GYN   (+) Abnormal uterine bleeding (AUB)       Clinical information reviewed:   Tobacco  Allergies  Meds   Med Hx  Surg Hx  OB Status  Fam Hx  Soc   Hx        NPO Detail:  NPO/Void Status  Carbohydrate Drink Given Prior to Surgery? : N  Date of Last Liquid: 04/09/24  Time of Last Liquid: 2200  Date of Last Solid: 04/09/24  Time of Last Solid: 1700  Last Intake Type: Clear fluids  Time of Last Void: 0930         Physical Exam    Airway  Mallampati: II  TM distance: >3 FB  Neck ROM: full     Cardiovascular   Rhythm: regular  Rate: normal     Dental - normal exam     Pulmonary   Breath sounds clear to auscultation     Abdominal            Anesthesia Plan    History of general anesthesia?: yes  History of complications of general anesthesia?: no    ASA 3     general     The patient is not a current smoker.    intravenous induction   Postoperative administration of opioids is intended.  Anesthetic plan and risks discussed with patient.    Plan discussed with CRNA.

## 2024-04-10 NOTE — ANESTHESIA POSTPROCEDURE EVALUATION
Patient: April Dawkins    Procedure Summary       Date: 04/10/24 Room / Location: [facility] OR 05 / Virtual [facility] OR    Anesthesia Start: 0941 Anesthesia Stop: 1044    Procedure: LEFT THUMB CARPOMETACARPAL ARTHROPLASTY WITH POSSIBLE TENDON TRANSFER (Left: Thumb) Diagnosis:       Unilateral primary osteoarthritis of first carpometacarpal joint, left hand      Pre-op exam      (Unilateral primary osteoarthritis of first carpometacarpal joint, left hand [M18.12])      (Pre-op exam [Z01.818])    Surgeons: Erlin Ambriz DO Responsible Provider: Hesham Spivey MD    Anesthesia Type: general ASA Status: 3            Anesthesia Type: general    Vitals Value Taken Time   /100 04/10/24 1039   Temp 36 04/10/24 1044   Pulse 78 04/10/24 1043   Resp 13 04/10/24 1043   SpO2 99 % 04/10/24 1043   Vitals shown include unfiled device data.    Anesthesia Post Evaluation    Patient location during evaluation: PACU  Patient participation: complete - patient participated  Level of consciousness: awake and alert  Pain management: satisfactory to patient  Multimodal analgesia pain management approach  Airway patency: patent  Two or more strategies used to mitigate risk of obstructive sleep apnea  Cardiovascular status: acceptable  Respiratory status: acceptable  Hydration status: acceptable  Postoperative Nausea and Vomiting: none        There were no known notable events for this encounter.

## 2024-04-23 ENCOUNTER — EVALUATION (OUTPATIENT)
Dept: OCCUPATIONAL THERAPY | Facility: CLINIC | Age: 49
End: 2024-04-23
Payer: COMMERCIAL

## 2024-04-23 ENCOUNTER — OFFICE VISIT (OUTPATIENT)
Dept: ORTHOPEDIC SURGERY | Facility: CLINIC | Age: 49
End: 2024-04-23
Payer: COMMERCIAL

## 2024-04-23 ENCOUNTER — HOSPITAL ENCOUNTER (OUTPATIENT)
Dept: RADIOLOGY | Facility: CLINIC | Age: 49
Discharge: HOME | End: 2024-04-23
Payer: COMMERCIAL

## 2024-04-23 DIAGNOSIS — M19.049 CMC ARTHRITIS: ICD-10-CM

## 2024-04-23 DIAGNOSIS — M25.60 JOINT STIFFNESS: Primary | ICD-10-CM

## 2024-04-23 PROCEDURE — L3808 WHFO, RIGID W/O JOINTS: HCPCS

## 2024-04-23 PROCEDURE — 97110 THERAPEUTIC EXERCISES: CPT | Mod: GO

## 2024-04-23 PROCEDURE — 73140 X-RAY EXAM OF FINGER(S): CPT | Mod: LEFT SIDE | Performed by: ORTHOPAEDIC SURGERY

## 2024-04-23 PROCEDURE — 97165 OT EVAL LOW COMPLEX 30 MIN: CPT | Mod: GO

## 2024-04-23 PROCEDURE — 3008F BODY MASS INDEX DOCD: CPT | Performed by: ORTHOPAEDIC SURGERY

## 2024-04-23 PROCEDURE — 73140 X-RAY EXAM OF FINGER(S): CPT | Mod: LT

## 2024-04-23 PROCEDURE — 99024 POSTOP FOLLOW-UP VISIT: CPT | Performed by: ORTHOPAEDIC SURGERY

## 2024-04-23 NOTE — PROGRESS NOTES
Occupational Therapy Upper Extremity Evaluation Note    Date: 4/23/2024  Time in: 1010  Time out:1048  Total Time: 38 minutes    HC OT WRIST/HAND/FINGER ORTHOSIS, RIGID W/O JOINTS, CUSTOM RICARDO [id number]  4/23/2024 Rowdy Khan, OT  1 Filed    Associated Dx: Joint stiffness [M25.60]   HC OT THERAPEUTIC EXERCISES [id number]  4/23/2024 Rowdy Khan OT GO 1 Filed    Associated Dx: Joint stiffness [M25.60]   HC OT OCCUPATIONAL THERAPY EVAL LOW COMPLEX 30 MINS               April Dawkins   YOB: 1975   MRN: [id number]    Referring Physician: Dr. 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 She reports she likes to ride motorcycle and take care of her granddaughter.  Pain and Location: piercing and tingling 8/10 left thumb and forearm  Chief Complaint: Not being able  to use left hand.  Patient's goal for therapy: full range of thumb.  Outcome Measure:  Initial evaluation Quick DASH 50.00%    Objective  Observation:Patient to therapy with guarding thumb.  Skin/ Wound/Scar: tender mildly adhered surgical scar.  Edema: Mild edema   Sensation: tingling/numbness thumb   Dexterity/ Coordination: limited     Upper Extremity ROM   Elbow flex/extension full  Forearm supination/pronation full  Wrist Extension/Flexion  55/35  Radial deviation/ulnar deviation 10/45    Hand ROM  AROM   THUMB      Kapandji 6    Palmar Abduction 60    Radial Abduction 65     Finger (DPC)        Index Middle Ring Small    DPC DPC DPC DPC     Left thumb MP 0/35  IP 0/30             Hand Strength-NT                Gross grasp(dynamometer)  (lbs)                             (2nd setting) Right         Left                Pinch (lbs)                             Key  right  left                            Moise   right     left    ADLS/IADLS:  Right side for all self care.      Treatment Completed This Date:  Patient fabricated custom thermoplastic thumb spica with purpose, wearing schedule, precautions, and care discussed. Patient demonstrated independence with donning/doffing orthosis. Patient instructed to contact therapist if she should need remolding of orthosis for chaqnges in swelling and or comfort.  Patient instructed in distal scar mobilization, elevation, AROM wrist and thumb. Follow through expected with home program.    Assessment  Patient is a 49 y/o right hand  dominate female  diagnosed with left osteoarthritis CMC left thumb. She is 1 week 6 days post-operative Left CMC arthroplasty. Patient reports pain and decreased independence with ADLs/ IADLs. Patient demonstrates decreased ROM, edema, tender and adhered scar, newly healing structure requiring orthosis, and decreased independence with ADLs/IADLs  per Quick DASH score. Patient will benefit from attending occupational therapy to improve functional use of her left  hand.     Plan of Care  Goals to be achieved by 6  weeks    Patient's level of independence with ADLs/ IADLs will improve by at least 50% per the quick DASH by discharge.    Patient will demonstrate full wrist ROM to complete ADLs/IADLs independently by discharge.    Patient will demonstrate full thumb ROM to improve participation in ADLs/IADLs by discharge.    Patient will report understanding of home program, demonstrate independence and verbalize precautions.    Patient will report follow through with wearing of orthosis as instructed by therapist.    Patient will report pain free use of hand for completing ADLs/IADLS by discharge.    Patient's scar will be supple and demonstrate good healing that does not limit function  by discharge.     Intervention    Therapeutic exercises, Therapeutic activity, manual therapy, orthosis, fluidotherapy, paraffin, taping, patient education, home program      Plan  Frequency 1/week  Duration 6 weeks    Patient and therapist developed goals for therapy and patient verbalizing agreement to plan of care

## 2024-04-23 NOTE — PROGRESS NOTES
4/23/2024    Chief Complaint   Patient presents with    Left Hand - Pain, New Patient Visit, Post-op     Lt thumb cmc  DOS: 04/10/24  Xrays today       History of Present Illness:  Patient April Dawkins , 48 y.o. female, presents today, 4/23/2024, for evaluation of left thumb  CMC arthroplasty, 2 weeks postop Dinah was doing well in the interim since surgery until yesterday when she was entering her daughter's home she tripped she struck her thumb that was contained within the splint on the door jam.  Since that time she has had increased pain and some numbness to the thumb.  She was compliant with nonweightbearing and splinting restrictions in the postoperative period denies any fevers, chills, constitutional symptoms. .         Review of Systems:   GENERAL: Negative  GI: Negative  MUSCULOSKELETAL: See HPI  SKIN: Negative  NEURO:  Negative     Physical Exam:  GENERAL:  Alert and oriented to person, place, and time.  No acute distress and breathing comfortably; pleasant and cooperative with the examination.  HEENT:  Head is normocephalic and atraumatic.  NECK:  Supple, no visible swelling.  CARDIOVASCULAR:  No palpable tachycardia.  LUNGS:  No audible wheezing or labored breathing.  ABDOMEN:  Nondistended.  Extremities: The surgical incision is clean, dry, intact, and appears to be healing well.  No active bleeding, erythema, warmth, drainage, or signs of infection.  Appropriate functional ROM demonstrated with flexion/extension of the digits, and flexion/extension/pronosupination of the wrist.     Imaging/Test Results:  3 views of the left thumb taken in the office today show no acute fracture or dislocation.  Surgical resection of trapezium is noted there is no evidence of subsidence of CMC arthroplasty arrangement.  Continue good alignment throughout all 3 planes.     Assessment:  Left thumb CMC arthroplasty, 2 weeks postop.     Plan:  Sutures were removed in the office today.  The patient will have  restrictions of nonweightbearing to the left upper extremity.  We discussed and reviewed home exercise program for range of motion recovery, scar massage, and desensitization techniques.  Occupational Therapy referral order was provided for custom splint fabrication and CMC rehab protocols .  The patient will follow up with our office in 4 weeks, repeat x-rays 3 views of the left thumb upon return.  All patient questions answered at today's visit.    Aurora Arauz PA-C

## 2024-04-29 ENCOUNTER — OFFICE VISIT (OUTPATIENT)
Dept: PRIMARY CARE | Facility: CLINIC | Age: 49
End: 2024-04-29
Payer: COMMERCIAL

## 2024-04-29 ENCOUNTER — OFFICE VISIT (OUTPATIENT)
Dept: ORTHOPEDIC SURGERY | Facility: CLINIC | Age: 49
End: 2024-04-29
Payer: COMMERCIAL

## 2024-04-29 ENCOUNTER — APPOINTMENT (OUTPATIENT)
Dept: OCCUPATIONAL THERAPY | Facility: CLINIC | Age: 49
End: 2024-04-29
Payer: COMMERCIAL

## 2024-04-29 ENCOUNTER — HOSPITAL ENCOUNTER (OUTPATIENT)
Dept: RADIOLOGY | Facility: CLINIC | Age: 49
Discharge: HOME | End: 2024-04-29
Payer: COMMERCIAL

## 2024-04-29 VITALS
SYSTOLIC BLOOD PRESSURE: 130 MMHG | HEART RATE: 87 BPM | DIASTOLIC BLOOD PRESSURE: 84 MMHG | WEIGHT: 180 LBS | BODY MASS INDEX: 30.9 KG/M2 | OXYGEN SATURATION: 97 %

## 2024-04-29 DIAGNOSIS — M19.049 CMC ARTHRITIS: ICD-10-CM

## 2024-04-29 DIAGNOSIS — M70.22 OLECRANON BURSITIS OF LEFT ELBOW: ICD-10-CM

## 2024-04-29 DIAGNOSIS — E66.09 CLASS 1 OBESITY DUE TO EXCESS CALORIES WITHOUT SERIOUS COMORBIDITY WITH BODY MASS INDEX (BMI) OF 33.0 TO 33.9 IN ADULT: Primary | ICD-10-CM

## 2024-04-29 PROCEDURE — 73140 X-RAY EXAM OF FINGER(S): CPT | Mod: LEFT SIDE | Performed by: RADIOLOGY

## 2024-04-29 PROCEDURE — 1036F TOBACCO NON-USER: CPT | Performed by: FAMILY MEDICINE

## 2024-04-29 PROCEDURE — 3008F BODY MASS INDEX DOCD: CPT | Performed by: ORTHOPAEDIC SURGERY

## 2024-04-29 PROCEDURE — 99213 OFFICE O/P EST LOW 20 MIN: CPT | Performed by: FAMILY MEDICINE

## 2024-04-29 PROCEDURE — 1036F TOBACCO NON-USER: CPT | Performed by: ORTHOPAEDIC SURGERY

## 2024-04-29 PROCEDURE — 73140 X-RAY EXAM OF FINGER(S): CPT | Mod: LT

## 2024-04-29 PROCEDURE — 3008F BODY MASS INDEX DOCD: CPT | Performed by: FAMILY MEDICINE

## 2024-04-29 PROCEDURE — 99024 POSTOP FOLLOW-UP VISIT: CPT | Performed by: ORTHOPAEDIC SURGERY

## 2024-04-29 RX ORDER — PHENTERMINE HYDROCHLORIDE 37.5 MG/1
37.5 CAPSULE ORAL
Qty: 30 CAPSULE | Refills: 0 | Status: SHIPPED | OUTPATIENT
Start: 2024-04-29 | End: 2024-05-21 | Stop reason: SDUPTHER

## 2024-04-29 RX ORDER — FLUTICASONE PROPIONATE 50 MCG
2 SPRAY, SUSPENSION (ML) NASAL DAILY
COMMUNITY
Start: 2024-04-05

## 2024-04-29 ASSESSMENT — ENCOUNTER SYMPTOMS
ABDOMINAL PAIN: 0
SHORTNESS OF BREATH: 0
DIARRHEA: 0
SLEEP DISTURBANCE: 0
ARTHRALGIAS: 1
CONSTIPATION: 0
PALPITATIONS: 0
APPETITE CHANGE: 1

## 2024-04-29 ASSESSMENT — PATIENT HEALTH QUESTIONNAIRE - PHQ9
1. LITTLE INTEREST OR PLEASURE IN DOING THINGS: NOT AT ALL
2. FEELING DOWN, DEPRESSED OR HOPELESS: NOT AT ALL
SUM OF ALL RESPONSES TO PHQ9 QUESTIONS 1 AND 2: 0
1. LITTLE INTEREST OR PLEASURE IN DOING THINGS: NOT AT ALL
2. FEELING DOWN, DEPRESSED OR HOPELESS: NOT AT ALL
SUM OF ALL RESPONSES TO PHQ9 QUESTIONS 1 AND 2: 0

## 2024-04-29 NOTE — PROGRESS NOTES
Subjective   Patient ID: April Dawkins is a 48 y.o. female who presents for No chief complaint on file..  HPI  April presents for follow up obesity, also has some worsening bursitis to left elbow.    She is overall very happy with response to Phentermine.  She was not able to start on it right away due to vocal cord biopsies (which are benign)  Review of Systems   Constitutional:  Positive for appetite change.   Respiratory:  Negative for shortness of breath.    Cardiovascular:  Negative for chest pain and palpitations.   Gastrointestinal:  Negative for abdominal pain, constipation and diarrhea.   Musculoskeletal:  Positive for arthralgias.   Psychiatric/Behavioral:  Negative for sleep disturbance.        Objective   Vitals:    04/29/24 1037   BP: 130/84   Pulse: 87   SpO2: 97%   Weight: 81.6 kg (180 lb)      Physical Exam  Constitutional:       Appearance: She is obese.   HENT:      Head: Normocephalic and atraumatic.   Eyes:      Extraocular Movements: Extraocular movements intact.      Pupils: Pupils are equal, round, and reactive to light.   Cardiovascular:      Rate and Rhythm: Normal rate and regular rhythm.   Pulmonary:      Effort: Pulmonary effort is normal.      Breath sounds: Normal breath sounds.   Abdominal:      General: There is no distension.      Tenderness: There is no abdominal tenderness.   Musculoskeletal:      Left elbow: Effusion (No warmth) present.      Cervical back: Normal range of motion and neck supple.   Neurological:      General: No focal deficit present.      Mental Status: She is alert and oriented to person, place, and time.   Psychiatric:         Mood and Affect: Mood normal.         Behavior: Behavior normal.         Assessment/Plan   There are no diagnoses linked to this encounter.    Problem List Items Addressed This Visit             ICD-10-CM    Class 1 obesity due to excess calories without serious comorbidity with body mass index (BMI) of 33.0 to 33.9 in adult - Primary  E66.09, Z68.33     Has had great response to Phentermine.  Will continue, plan follow up in 1 month         Relevant Medications    phentermine 37.5 mg capsule    Other Relevant Orders    Follow Up In Primary Care - Established     Other Visit Diagnoses         Codes    Olecranon bursitis of left elbow     M70.22    Advised wrapping with ACE bandage, icing, and NSAID.

## 2024-04-29 NOTE — PROGRESS NOTES
History present illness: Patient presents today status post a painful pop to the left wrist.  She is status post CMC arthroplasty done 19 days ago.        Physical examination:  General: Alert and oriented to person, place, and time.  No acute distress and breathing comfortably: Pleasant and cooperative with examination.  Extremities: Evaluation of the left upper extremity finds the patient had palpable radial artery at the wrist with brisk capillary refill to all digits.  Patient has intact sensation to axillary radial median and ulnar nerves.  There are no open wounds.  There are no signs of infection.  There is no evidence of lymphedema or lymphatic streaking.  The patient has supple compartments to left arm forearm and hand.  Swelling about distal course of the left wrist FCR tendon.  The tendon appears to be intact to palpation.      Diagnostic studies: X-rays of the left thumb demonstrate prior resection trapezium.  No acute fracture or dislocation.      Assessment: Status post left thumb CMC arthroplasty with painful pop      Plan: Recommendations were made to slow down therapy protocols immobilize and ice to provide some rest and get back on track with therapy over time.  Follow-up with us as previously scheduled.  X-rays done upon return.  Plan for reevaluation of left wrist FCR tendon at next follow-up.      Procedure:        Procedure:

## 2024-05-01 ENCOUNTER — TREATMENT (OUTPATIENT)
Dept: OCCUPATIONAL THERAPY | Facility: CLINIC | Age: 49
End: 2024-05-01
Payer: COMMERCIAL

## 2024-05-01 DIAGNOSIS — M25.60 JOINT STIFFNESS: Primary | ICD-10-CM

## 2024-05-01 PROCEDURE — 97110 THERAPEUTIC EXERCISES: CPT | Mod: GO

## 2024-05-01 NOTE — PROGRESS NOTES
"Occupational Therapy Upper Extremity Progress Note    Date: 5/1/2024  Time in: 1447  Time out:1533  Total Time: 46  minutes  HC OT THERAPEUTIC EXERCISES  01711 (CPT®)Mods:GOQty:3  April Dawkins   YOB: 1975   MRN: [id number]    Referring Physician: Dr. Ambriz   for: orthosis, ROM  Diagnosis: Left thumb carpometacarpal Osteoarthritis 1st CMC   Date of onset 1/2/2023/ Surgery 4/10/2024                (3 weeks post-surgery Left thumb carpometacarpal arthroplasty)  Precautions: NWB left arm, RA    Insurance  Visit Number: 2  Visits Allowed: 100  Authorization: None needed  Date Range: n/a    Subjective  Patient reports she felt  a lot of pain and had multiple \"awilda horse type of pain x 5\" at work today. She states she was fine until she tried to button her jeans and heard a pop and had shooting pain on Saturday.  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 She reports she likes to ride motorcycle and take care of her granddaughter.  Pain and Location: 0/10 at rest  10/10 left thumb and down  forearm \"Awilda Horse\"  Chief Complaint: Not being able  to use left hand.  Patient's goal for therapy: full range of thumb.  Outcome Measure:  Initial evaluation Quick DASH 50.00%    Objective  Observation:Patient to therapy with custom thermoplastic thumb spica.  Skin/ Wound/Scar: tender mildly adhered surgical scar.  Edema: Mild edema   Sensation: tingling/numbness thumb   Dexterity/ Coordination: limited     Upper Extremity ROM   Elbow flex/extension full  Forearm supination/pronation full  Wrist Extension/Flexion  65*/45*  Radial deviation/ulnar deviation 10/45    Hand ROM  AROM   THUMB      Kapandji 7*    Palmar Abduction 65*    Radial Abduction 70*     Finger (DPC)        Index Middle Ring Small    DPC DPC DPC DPC     Left thumb MP 0/40* IP 0/30             Hand Strength-NT                Gross grasp(dynamometer) (lbs)                             (2nd setting) Right         Left       "          Pinch (lbs)                             Key  right  left                            Moise   right     left    ADLS/IADLS:  Right side for all self care.      Treatment Completed This Date:  Re-evaluated ROM and discussed findings with patient. Patient received fluidotherapy in conjunction with completing ROM wrist and thumb. Patient completed gentle AAROM for her wrist with ball and AROM with juxta exerciser. She completed fine motor finger translation exercises with grooved pegboard. Orthosis checked and slightly adjusted for comfort. She was issued a new liner for orthosis. Reviewed home program.      Assessment  Patient is a 49 y/o right hand  dominate female  diagnosed with left osteoarthritis CMC left thumb. She is 3 week post-operative Left CMC arthroplasty.  Patient demonstrating nice gains in ROM for wrist and thumb.  She tolerated all exercises well and continues to  benefit from attending occupational therapy to improve functional use of her left  hand.     Plan of Care  Goals to be achieved by 6  weeks    Patient's level of independence with ADLs/ IADLs will improve by at least 50% per the quick DASH by discharge.    Patient will demonstrate full wrist ROM to complete ADLs/IADLs independently by discharge.    Patient will demonstrate full thumb ROM to improve participation in ADLs/IADLs by discharge.    Patient will report understanding of home program, demonstrate independence and verbalize precautions.    Patient will report follow through with wearing of orthosis as instructed by therapist.    Patient will report pain free use of hand for completing ADLs/IADLS by discharge.    Patient's scar will be supple and demonstrate good healing that does not limit function by discharge.     Intervention    Therapeutic exercises, Therapeutic activity, manual therapy, orthosis, fluidotherapy, paraffin, taping, patient education, home program      Plan  Frequency 1/week  Duration 6 weeks    Patient and  therapist developed goals for therapy and patient verbalizing agreement to plan of care

## 2024-05-13 ENCOUNTER — APPOINTMENT (OUTPATIENT)
Dept: OCCUPATIONAL THERAPY | Facility: CLINIC | Age: 49
End: 2024-05-13
Payer: COMMERCIAL

## 2024-05-13 ENCOUNTER — CONSULT (OUTPATIENT)
Dept: ALLERGY | Facility: CLINIC | Age: 49
End: 2024-05-13
Payer: COMMERCIAL

## 2024-05-13 ENCOUNTER — LAB (OUTPATIENT)
Dept: LAB | Facility: LAB | Age: 49
End: 2024-05-13
Payer: COMMERCIAL

## 2024-05-13 VITALS
TEMPERATURE: 98.6 F | RESPIRATION RATE: 17 BRPM | HEART RATE: 78 BPM | OXYGEN SATURATION: 98 % | BODY MASS INDEX: 29.19 KG/M2 | SYSTOLIC BLOOD PRESSURE: 120 MMHG | DIASTOLIC BLOOD PRESSURE: 80 MMHG | HEIGHT: 64 IN | WEIGHT: 171 LBS

## 2024-05-13 DIAGNOSIS — J32.8 OTHER CHRONIC SINUSITIS: ICD-10-CM

## 2024-05-13 DIAGNOSIS — Z01.818 PRE-OP EXAM: ICD-10-CM

## 2024-05-13 DIAGNOSIS — J37.0 CHRONIC LARYNGITIS: ICD-10-CM

## 2024-05-13 DIAGNOSIS — J32.8 OTHER CHRONIC SINUSITIS: Primary | ICD-10-CM

## 2024-05-13 LAB
ALBUMIN SERPL BCP-MCNC: 4.7 G/DL (ref 3.4–5)
ALP SERPL-CCNC: 46 U/L (ref 33–110)
ALT SERPL W P-5'-P-CCNC: 13 U/L (ref 7–45)
ANION GAP SERPL CALC-SCNC: 15 MMOL/L (ref 10–20)
AST SERPL W P-5'-P-CCNC: 16 U/L (ref 9–39)
BILIRUB SERPL-MCNC: 0.7 MG/DL (ref 0–1.2)
BUN SERPL-MCNC: 15 MG/DL (ref 6–23)
CALCIUM SERPL-MCNC: 9.7 MG/DL (ref 8.6–10.6)
CHLORIDE SERPL-SCNC: 102 MMOL/L (ref 98–107)
CO2 SERPL-SCNC: 24 MMOL/L (ref 21–32)
CREAT SERPL-MCNC: 0.62 MG/DL (ref 0.5–1.05)
EGFRCR SERPLBLD CKD-EPI 2021: >90 ML/MIN/1.73M*2
GLUCOSE SERPL-MCNC: 89 MG/DL (ref 74–99)
INR PPP: 1 (ref 0.9–1.1)
POTASSIUM SERPL-SCNC: 3.8 MMOL/L (ref 3.5–5.3)
PROT SERPL-MCNC: 7.4 G/DL (ref 6.4–8.2)
PROTHROMBIN TIME: 10.8 SECONDS (ref 9.8–12.8)
SODIUM SERPL-SCNC: 137 MMOL/L (ref 136–145)

## 2024-05-13 PROCEDURE — 82784 ASSAY IGA/IGD/IGG/IGM EACH: CPT

## 2024-05-13 PROCEDURE — 85610 PROTHROMBIN TIME: CPT

## 2024-05-13 PROCEDURE — 99204 OFFICE O/P NEW MOD 45 MIN: CPT | Performed by: ALLERGY & IMMUNOLOGY

## 2024-05-13 PROCEDURE — 86003 ALLG SPEC IGE CRUDE XTRC EA: CPT

## 2024-05-13 PROCEDURE — 86317 IMMUNOASSAY INFECTIOUS AGENT: CPT

## 2024-05-13 PROCEDURE — 80053 COMPREHEN METABOLIC PANEL: CPT

## 2024-05-13 PROCEDURE — 85025 COMPLETE CBC W/AUTO DIFF WBC: CPT

## 2024-05-13 PROCEDURE — 36415 COLL VENOUS BLD VENIPUNCTURE: CPT

## 2024-05-13 PROCEDURE — 86162 COMPLEMENT TOTAL (CH50): CPT

## 2024-05-13 PROCEDURE — 82785 ASSAY OF IGE: CPT

## 2024-05-13 RX ORDER — ETANERCEPT 50 MG/ML
50 SOLUTION SUBCUTANEOUS
COMMUNITY
Start: 2024-04-16

## 2024-05-13 NOTE — PROGRESS NOTES
Patient ID: April Dawkins is a 48 y.o. female.     Chief Complaint: NPV  History Of Present Illness  April Dawkins is a 48 y.o. female with PMx sore throat presenting for consultation.     Sore throat since January.  ENT-bx vocal cords-non cancerous.  Still with sore throat  Wonders if allergy    RA dx in December. Started Methotrexate in December 2023.    History of recurrent sinus infection every Dec/Jan.      Food Allergy  No    Eczema/ Atopic Dermatitis  Some mild dry patches.    Asthma  No    Rhinoconjunctivitis  No sneeze, denies pnd    Drug Allergy   No    Insect Allergy   No    Infections  No history of frequent or recurrent infections      Review of Systems    Pertinent positives and negatives have been assessed in the HPI. All other systems have been reviewed and are negative except as noted in the HPI.    Allergies  Patient has no known allergies.    Past Medical History  She has a past medical history of Arthritis (2000), Coronary artery disease (10/16/2023), Transient ischemic attack (TIA) (10/16/2023), and Varicella (1978).    Family History  Family History   Problem Relation Name Age of Onset    Cervical cancer Mother Yovana     Other (cva) Mother Yovana     Coronary artery disease Mother Yovana     Hypertension Mother Yovana     Peripheral vascular disease Mother Yovana     Arthritis Mother Yovana     Cancer Mother Yovana     COPD Mother Yovana     Heart disease Mother Yovana     Stroke Mother Yovana     Hypertension Father Naseem     Heart disease Father Naseem     Stroke Father Naseem     Other (CVA) Brother      Diabetes Brother Ed     Heart disease Brother Rodrigue     Heart disease Brother Pranay        No history of food allergy or atopic disease in the family.    Surgical History  She has a past surgical history that includes Ankle surgery (01/26/2016); MR angio head wo IV contrast (01/10/2023); MR angio neck wo IV contrast (01/10/2023); Eye surgery (2008); and Hysterectomy (March  2023).    Social/Environmental History  She reports that she quit smoking about 2 years ago. Her smoking use included cigarettes. She started smoking about 36 years ago. She has a 103 pack-year smoking history. She has never used smokeless tobacco. She reports current alcohol use. She reports current drug use. Frequency: 3.00 times per week. Drug: Marijuana.    Home: Lives in a house   Floors: vinyl plank and carpet upstairs  Air Conditioning: Central  Smoker: quit 3 yrs ago. Pm and off socially. 1 pack lasted a month.  Pets: 4 dogs  Infestations: No  Molds: No  Occupation:      MEDICATIONS  Current Outpatient Medications on File Prior to Visit   Medication Sig Dispense Refill    albuterol 90 mcg/actuation inhaler Inhale 2 puffs every 6 hours if needed.      aspirin 81 mg EC tablet Take 1 tablet (81 mg) by mouth once daily.      EnbreL Mini 50 mg/mL (1 mL) cartridge Inject 1 mL (50 mg) under the skin 1 (one) time per week.      fluticasone (Flonase) 50 mcg/actuation nasal spray Administer 2 sprays into each nostril once daily.      folic acid (Folvite) 1 mg tablet once every 24 hours.      loratadine (Claritin) 10 mg tablet Take 1 tablet (10 mg) by mouth once daily.      methotrexate (Trexall) 2.5 mg tablet TAKE 8 TABLETS BY MOUTH ONE TIME PER WEEK      montelukast (Singulair) 10 mg tablet Take 1 tablet (10 mg) by mouth once daily at bedtime. 30 tablet 5    omeprazole (PriLOSEC) 40 mg DR capsule Take 1 capsule (40 mg) by mouth once daily in the morning. Take before meals. Do not crush or chew. 30 capsule 2    phentermine 37.5 mg capsule Take 1 capsule (37.5 mg) by mouth once daily in the morning. Take before meals. 30 capsule 0    traZODone (Desyrel) 50 mg tablet Take 1 tablet (50 mg) by mouth once daily at bedtime.      Ubrelvy 100 mg tablet tablet TAKE 1 TABLET BY MOUTH AT ONSET OF MIGRAINE. MAY REPEAT IN 2 HOURS IF NO RELIEF.      valACYclovir (Valtrex) 500 mg tablet Take 1 tablet (500 mg) by mouth once  "daily.      [DISCONTINUED] PlaqueniL 200 mg tablet once every 24 hours.       No current facility-administered medications on file prior to visit.         Physical Exam  Visit Vitals  /80   Pulse 78   Temp 37 °C (98.6 °F) (Temporal)   Resp 17   Ht 1.626 m (5' 4\")   Wt 77.6 kg (171 lb)   SpO2 98%   BMI 29.35 kg/m²   OB Status Hysterectomy   Smoking Status Former   BSA 1.87 m²       Wt Readings from Last 1 Encounters:   05/13/24 77.6 kg (171 lb)       Physical Exam    General: Well appearing, no acute distress  Head: Normocephalic, atraumatic, neck supple without lymphadenopathy  Eyes: EOMI, non-injected  Nose: No nasal crease, nares patent, slightly boggy turbinates, minimal discharge  Throat: Normal dentition, posterior oropharyngeal erythema, clear drainage  Heart: Regular rate and rhythm  Lungs: Clear to auscultation bilaterally, effort normal  Abdomen: Soft, non-tender, normal bowel sounds  Extremities: left arm splinted  Skin: No rashes/lesions  Psych: normal mood and affect    LAB RESULTS:      CMP:  Recent Labs     10/16/23  0921 01/23/23  1034     --    K 3.6  --      --    CO2 26  --    ANIONGAP 11  --    BUN 14  --    CREATININE 0.65 0.68   EGFR >90  --      Recent Labs     10/16/23  0921   ALBUMIN 4.1   ALKPHOS 36   ALT 12   AST 12   BILITOT 0.4       Assessment/Plan   April is a 47 yo with sore throat, concern for allergy as a trigger or immune defect. She has been diagnosed with seronegative arthritis and is on Enbrel and Methotrexate.  -obtain labs and I will call results        Nicky Disla,    "

## 2024-05-14 LAB
BASOPHILS # BLD AUTO: 0 X10*3/UL (ref 0–0.1)
BASOPHILS NFR BLD AUTO: 0 %
EOSINOPHIL # BLD AUTO: 0.02 X10*3/UL (ref 0–0.7)
EOSINOPHIL NFR BLD AUTO: 0.4 %
ERYTHROCYTE [DISTWIDTH] IN BLOOD BY AUTOMATED COUNT: 14 % (ref 11.5–14.5)
HCT VFR BLD AUTO: 38.1 % (ref 36–46)
HGB BLD-MCNC: 12.7 G/DL (ref 12–16)
IGA SERPL-MCNC: 235 MG/DL (ref 70–400)
IGG SERPL-MCNC: 1050 MG/DL (ref 700–1600)
IGM SERPL-MCNC: 152 MG/DL (ref 40–230)
IMM GRANULOCYTES # BLD AUTO: 0.01 X10*3/UL (ref 0–0.7)
IMM GRANULOCYTES NFR BLD AUTO: 0.2 % (ref 0–0.9)
LYMPHOCYTES # BLD AUTO: 1.2 X10*3/UL (ref 1.2–4.8)
LYMPHOCYTES NFR BLD AUTO: 26.3 %
MCH RBC QN AUTO: 29.3 PG (ref 26–34)
MCHC RBC AUTO-ENTMCNC: 33.3 G/DL (ref 32–36)
MCV RBC AUTO: 88 FL (ref 80–100)
MONOCYTES # BLD AUTO: 0.4 X10*3/UL (ref 0.1–1)
MONOCYTES NFR BLD AUTO: 8.8 %
NEUTROPHILS # BLD AUTO: 2.94 X10*3/UL (ref 1.2–7.7)
NEUTROPHILS NFR BLD AUTO: 64.3 %
NRBC BLD-RTO: 0 /100 WBCS (ref 0–0)
PLATELET # BLD AUTO: 284 X10*3/UL (ref 150–450)
RBC # BLD AUTO: 4.33 X10*6/UL (ref 4–5.2)
WBC # BLD AUTO: 4.6 X10*3/UL (ref 4.4–11.3)

## 2024-05-14 NOTE — PROGRESS NOTES
OCCUPATIONAL THERAPY TREATMENT NOTE    Patient Name:  April Dawkins   Patient MRN: [id number]  Date: 05/15/24  Time Calculation  Start Time: 0700  Stop Time: 0745  Time Calculation (min): 45 min    Insurance:  Visit number: 3 of 6  Insurance Type: Payor: GENERIC COMMERCIAL / Plan: GENERIC COMMERCIAL / Product Type: *No Product type* /   Authorization or Plan of Care date Range: 100v per madeline year  Copay: n/a  Referred by: Erlin Ambriz DO        OT Therapeutic Procedures Time Entry  Manual Therapy Time Entry: 15  Orthotic/Prosthetic Mgmt and/or Training (Subs Encounter) Time Entry: 5  Therapeutic Exercise Time Entry: 10  OT Modalities Time Entry  Whirlpool Time Entry: 15                  General:  Reason for visit: L thumb CMC arthroplasty   Referring provider: Erlin Ambriz DO     Assessment:  Progress towards functional goals: Improved mobility in L wrist/thumb and Reduce pain level  Response to interventions:  Objective measurements were assessed indicating decreased thumb AROM and /pinch strength. Small adjustments were made to custom thumb spica brace due to irritation over surgical scar. Patient reports decreased pain and thumb stiffness after fluidotherapy. STM and scar tissue massage performed to surgical scar to improve skin elasticity and decrease sensitivity to improve orthosis tolerance and improved L thumb function.  and Tolerated treatment session well without any increase in pain.  Justification for continued skilled care: To address remaining functional, objective and subjective deficits to allow them to return to full independence with ADLs. Modify and progress home exercise program. Skilled intervention required to improve ROM which will improve function. Reduce swelling to improve joint ROM and reduce muscular inhibition.    Plan:  Progress exercises as tolerated to improve overall UE strength and performance in daily  activities , Activities to improve fine motor coordination and manual dexterity skills to improve performance in ADLs and IADLs, Exercises to gradually increase range of motion., Manual therapy to  muscle tightness and improve joint mobility. , and Modalities as needed to address symptoms.  Visit plan: 1x for 6 weeks  Will begin thumb AROM exercises and will provide hand-based thumb CMC brace NV    Therapy diagnoses:   1. Unilateral primary osteoarthritis of first carpometacarpal joint, left hand  Follow Up In Occupational Therapy      2. Joint stiffness  Follow Up In Occupational Therapy    Follow Up In Occupational Therapy           Subjective:  April reports she feels like her condition is improving.  Progress towards functional goal:   Patient reports she is experiencing stiffness in L thumb, especially over surgical scar. Notes her custom thumb spica brace is tight around surgical scar, irritating it while patient is at work.  , Improved performance in daily activities , and Reduce pain level  Surgery took place on 04/10/24. Patient is 5 weeks post opt  Pain Location L thumb   Pain (0-10): 0   HEP adherence / understanding: compliance with the instructed home exercises.    Precautions:  Fall Risk: None    Precautions listed: thumb arthroplasty protocol     Objective:  Active range of motion:  L Wrist:  - Flexion: 65  - Extension: 70  - Rd dev: 20  - Ul dev: 30    L Thumb:  - CMC PABD: 40  - CMC RABD: 45  - MP ext: 0  - MP flex: 35  - IP ext: 0  - IP flex: 45     strength:  - RUE : 48#  - LUE : 39#    Pinch Strength:  - Lateral:       - RUE: 11#       - LUE: 5#  - 3 Jaw:       - RUE: 9#       - LUE: 6#    Outcome Measures:  OT Adult Other Outcome Measures:   9 Hole Peg Test: R: 14.7 secs      L: 22 secs  OT Adult Other Outcome Measures  Other Outcome Measures: 30 (QuickDASH)  (43.18%)      Goals:  1) Patient will report pain free use of hand for completing ADLs/IADLS by discharge.  2) Patient  "to increase Thumb MP flexion AROM by 10 degrees in order to improve independent performance in daily activities.   3) Patient will improve L  strength by 10lb to improve performance in lifting and grasping tasks.   4) Patient to improve L hand coordination on 9 hole peg test to <20 secs for dexterity tasks such as typing, buttoning, and zipping.   5) Patient to improve QuickDASH score to at or below 20% to increase independency in ADLs and IADLs.   6) Patient will report understanding of home program, demonstrate independence and verbalize precautions.  7) Patient will report follow through with wearing of orthosis as instructed by therapist.  8) Patient's scar will be supple and demonstrate good healing that does not limit function by discharge.     Treatment Performed: (\"NP\" = Not Performed)     Therapeutic Exercise: 15 mins  - Assessed objective measurements   - Demonstrated thumb MP passive and active flexion exercises. Provided HEP handout  - Educated patient regarding CMC arthroplasty protocol   Therapeutic Activities: NP   -   Manual Therapy: 15  - STM and scar tissue massage to thenar eminence and surgical scar  Neuromuscular Re-Education: NP   -   Modalities: 15 mins  - Fluidotherapy with wrist and digit AROM   Orthotic Mgmt Subs Encounter: 5 mins   - Made small adjustments to custom thumb spica brace   Education: Reviewed home exercise program.  "

## 2024-05-15 ENCOUNTER — TREATMENT (OUTPATIENT)
Dept: OCCUPATIONAL THERAPY | Facility: CLINIC | Age: 49
End: 2024-05-15
Payer: COMMERCIAL

## 2024-05-15 DIAGNOSIS — M18.12 UNILATERAL PRIMARY OSTEOARTHRITIS OF FIRST CARPOMETACARPAL JOINT, LEFT HAND: Primary | ICD-10-CM

## 2024-05-15 DIAGNOSIS — M25.60 JOINT STIFFNESS: ICD-10-CM

## 2024-05-15 LAB

## 2024-05-15 PROCEDURE — 97022 WHIRLPOOL THERAPY: CPT | Mod: GO

## 2024-05-15 PROCEDURE — 97140 MANUAL THERAPY 1/> REGIONS: CPT | Mod: GO

## 2024-05-15 PROCEDURE — 97110 THERAPEUTIC EXERCISES: CPT | Mod: GO

## 2024-05-15 ASSESSMENT — PAIN SCALES - GENERAL: PAINLEVEL_OUTOF10: 0 - NO PAIN

## 2024-05-15 ASSESSMENT — PAIN - FUNCTIONAL ASSESSMENT: PAIN_FUNCTIONAL_ASSESSMENT: 0-10

## 2024-05-16 ENCOUNTER — APPOINTMENT (OUTPATIENT)
Dept: OCCUPATIONAL THERAPY | Facility: CLINIC | Age: 49
End: 2024-05-16
Payer: COMMERCIAL

## 2024-05-16 LAB
S PN DA SERO 19F IGG SER-MCNC: 20.54 UG/ML
S PNEUM DA 1 IGG SER-MCNC: 0.55 UG/ML
S PNEUM DA 10A IGG SER-MCNC: 2.51 UG/ML
S PNEUM DA 11A IGG SER-MCNC: 1.13 UG/ML
S PNEUM DA 12F IGG SER-MCNC: 0.35 UG/ML
S PNEUM DA 14 IGG SER-MCNC: 13.04 UG/ML
S PNEUM DA 15B IGG SER-MCNC: 3.4 UG/ML
S PNEUM DA 17F IGG SER-MCNC: 0.16 UG/ML
S PNEUM DA 18C IGG SER-MCNC: 5.96 UG/ML
S PNEUM DA 19A IGG SER-MCNC: 7.63 UG/ML
S PNEUM DA 2 IGG SER-MCNC: 0.48 UG/ML
S PNEUM DA 20A IGG SER-MCNC: 0.36 UG/ML
S PNEUM DA 22F IGG SER-MCNC: 0.5 UG/ML
S PNEUM DA 23F IGG SER-MCNC: 3.25 UG/ML
S PNEUM DA 3 IGG SER-MCNC: 0.69 UG/ML
S PNEUM DA 33F IGG SER-MCNC: >13.39 UG/ML
S PNEUM DA 4 IGG SER-MCNC: 1.46 UG/ML
S PNEUM DA 5 IGG SER-MCNC: 0.34 UG/ML
S PNEUM DA 6B IGG SER-MCNC: 0.32 UG/ML
S PNEUM DA 7F IGG SER-MCNC: >27.96 UG/ML
S PNEUM DA 8 IGG SER-MCNC: 8.38 UG/ML
S PNEUM DA 9N IGG SER-MCNC: 0.27 UG/ML
S PNEUM DA 9V IGG SER-MCNC: 1 UG/ML
S PNEUM SEROTYPE IGG SER-IMP: NORMAL

## 2024-05-17 NOTE — PROGRESS NOTES
OCCUPATIONAL THERAPY TREATMENT NOTE    Patient Name:  April Dawkins   Patient MRN: [id number]  Date: 5/20/2024  Time Calculation  Start Time: 1115  Stop Time: 1200  Time Calculation (min): 45 min    Insurance:  Visit number: 4 of 6  Insurance Type: Payor: GENERIC COMMERCIAL / Plan: GENERIC COMMERCIAL / Product Type: *No Product type* /   Authorization or Plan of Care date Range: 100v per madeline year   Copay: n/a  Referred by: Erlin Ambriz DO        OT Therapeutic Procedures Time Entry  Manual Therapy Time Entry: 25  Therapeutic Exercise Time Entry: 5  OT Modalities Time Entry  Whirlpool Time Entry: 15                  General:  Reason for visit: L thumb CMC arthroplasty   Referring provider: Erlin Ambriz DO     Assessment:  Progress towards functional goals: Improved mobility in L wrist/thumb and Reduce pain level  Response to interventions:  Mild muscle tightness and joint stiffness noted to L thumb. STM performed to surgical scar to decrease sensitivity. Therapist began lightly stretching L thumb CMC, MP, and IP joints, as patient is 6 weeks post-opt. Patient notes mild pain in CMC area during stretching. Provided patient with short comfort cool thumb CMC brace to transition into. Intrusted patient to wear this brace daily, instead of custom forearm-based thumb spica.  Provided patient with HEP handout with thumb AROM exercises in all planes. Patient verbalized full understanding of HEP exercises and understanding of brace and how to don/doff.  and Tolerated treatment session well without any increase in pain.  Justification for continued skilled care: To address remaining functional, objective and subjective deficits to allow them to return to full independence with ADLs. Modify and progress home exercise program. Skilled intervention required to improve ROM which will improve function. Reduce swelling to improve joint ROM and reduce muscular  "inhibition.  Visit plan: 1x for 6 weeks    Plan:  Progress exercises as tolerated to improve overall UE strength and performance in daily activities , Activities to improve fine motor coordination and manual dexterity skills to improve performance in ADLs and IADLs, Exercises to gradually increase range of motion., Manual therapy to  muscle tightness and improve joint mobility. , and Modalities as needed to address symptoms.    Therapy diagnoses:   1. Joint stiffness  Follow Up In Occupational Therapy      2. Unilateral primary osteoarthritis of first carpometacarpal joint, left hand  Follow Up In Occupational Therapy           Subjective:  April reports she feels like her condition is improving.  Progress towards functional goal:   Patient reports her L thumb continues to feel tight. Patient has follow up with Dr. Ambriz tomorrow, 24.   Surgery took place on 04/10/24. Patient is 6 weeks post opt   Pain Location L thumb  Pain (0-10): 0   HEP adherence / understanding: compliance with the instructed home exercises.    Precautions:  Fall Risk: None    Precautions listed: thumb arthroplasty protocol     Objective: NP    Treatment Performed: (\"NP\" = Not Performed)     Therapeutic Exercise: 5 mins  - Demonstrated thumb AROM exercises in all planes. Provided HEP handout    Therapeutic Activities: NP  -   Manual Therapy: 25 mins  - STM and scar tissue massage to thenar eminence and surgical scar   - Joint mobilization and light stretching to L thumb  - Provided patient with a short comfort cool thumb CMC brace. Educated patient on purpose, donning/doffing, wearing schedule (daily), and proper care of brace.   - Patient demonstrated proper re-call by donning/doffing brace independently. Verbalized good understanding of brace.     Neuromuscular Re-Education: NP  -   Modalities: 15 mins  - Fluidotherapy with wrist and digit AROM       Education: Added Thumb AROM exercise(s) to HEP program Access Code [email or web address]  "

## 2024-05-20 ENCOUNTER — APPOINTMENT (OUTPATIENT)
Dept: OCCUPATIONAL THERAPY | Facility: CLINIC | Age: 49
End: 2024-05-20
Payer: COMMERCIAL

## 2024-05-20 ENCOUNTER — APPOINTMENT (OUTPATIENT)
Dept: ALLERGY | Facility: CLINIC | Age: 49
End: 2024-05-20
Payer: COMMERCIAL

## 2024-05-20 ENCOUNTER — TREATMENT (OUTPATIENT)
Dept: OCCUPATIONAL THERAPY | Facility: CLINIC | Age: 49
End: 2024-05-20
Payer: COMMERCIAL

## 2024-05-20 DIAGNOSIS — M25.60 JOINT STIFFNESS: ICD-10-CM

## 2024-05-20 DIAGNOSIS — M18.12 UNILATERAL PRIMARY OSTEOARTHRITIS OF FIRST CARPOMETACARPAL JOINT, LEFT HAND: ICD-10-CM

## 2024-05-20 PROCEDURE — 97140 MANUAL THERAPY 1/> REGIONS: CPT | Mod: GO

## 2024-05-20 PROCEDURE — 97022 WHIRLPOOL THERAPY: CPT | Mod: GO

## 2024-05-20 ASSESSMENT — PAIN - FUNCTIONAL ASSESSMENT: PAIN_FUNCTIONAL_ASSESSMENT: 0-10

## 2024-05-20 ASSESSMENT — PAIN SCALES - GENERAL: PAINLEVEL_OUTOF10: 0 - NO PAIN

## 2024-05-21 ENCOUNTER — APPOINTMENT (OUTPATIENT)
Dept: ORTHOPEDIC SURGERY | Facility: CLINIC | Age: 49
End: 2024-05-21
Payer: COMMERCIAL

## 2024-05-21 ENCOUNTER — OFFICE VISIT (OUTPATIENT)
Dept: ORTHOPEDIC SURGERY | Facility: CLINIC | Age: 49
End: 2024-05-21
Payer: COMMERCIAL

## 2024-05-21 ENCOUNTER — TELEPHONE (OUTPATIENT)
Dept: PRIMARY CARE | Facility: CLINIC | Age: 49
End: 2024-05-21
Payer: COMMERCIAL

## 2024-05-21 ENCOUNTER — HOSPITAL ENCOUNTER (OUTPATIENT)
Dept: RADIOLOGY | Facility: CLINIC | Age: 49
Discharge: HOME | End: 2024-05-21
Payer: COMMERCIAL

## 2024-05-21 DIAGNOSIS — M19.049 CMC ARTHRITIS: ICD-10-CM

## 2024-05-21 DIAGNOSIS — E66.09 CLASS 1 OBESITY DUE TO EXCESS CALORIES WITHOUT SERIOUS COMORBIDITY WITH BODY MASS INDEX (BMI) OF 33.0 TO 33.9 IN ADULT: ICD-10-CM

## 2024-05-21 PROCEDURE — 73140 X-RAY EXAM OF FINGER(S): CPT | Mod: LT

## 2024-05-21 PROCEDURE — 73140 X-RAY EXAM OF FINGER(S): CPT | Mod: LEFT SIDE | Performed by: ORTHOPAEDIC SURGERY

## 2024-05-21 PROCEDURE — 1036F TOBACCO NON-USER: CPT | Performed by: ORTHOPAEDIC SURGERY

## 2024-05-21 PROCEDURE — 99024 POSTOP FOLLOW-UP VISIT: CPT | Performed by: ORTHOPAEDIC SURGERY

## 2024-05-21 PROCEDURE — 3008F BODY MASS INDEX DOCD: CPT | Performed by: ORTHOPAEDIC SURGERY

## 2024-05-21 RX ORDER — PHENTERMINE HYDROCHLORIDE 37.5 MG/1
37.5 CAPSULE ORAL
Qty: 30 CAPSULE | Refills: 0 | Status: SHIPPED | OUTPATIENT
Start: 2024-05-29 | End: 2024-06-28

## 2024-05-21 NOTE — TELEPHONE ENCOUNTER
Patient called stating she cannot keep her appointment on 5/29 for her weight loss follow up. She is asking if you will send in the medication and she will make an appointment at the new office.

## 2024-05-21 NOTE — PROGRESS NOTES
5/21/2024    Chief Complaint   Patient presents with    Left Hand - Pain     Lt thumb cmc arthroplasty  DOS: 4/10/24  [place] pop in wrist  Xrays today        History of Present Illness:  Patient April Dawkins , 48 y.o. female, presents today, 5/21/2024, for evaluation of left thumb  CMC arthroplasty, 6 weeks postop and doing well.  She states she has minimal soreness discomfort.  She had a painful pop along the way, but this resolved.  She is working with therapy and motion recovery and wearing her splint.         Review of Systems:   GENERAL: Negative  GI: Negative  MUSCULOSKELETAL: See HPI  SKIN: Negative  NEURO:  Negative     Physical Exam:  GENERAL:  Alert and oriented to person, place, and time.  No acute distress and breathing comfortably; pleasant and cooperative with the examination.  HEENT:  Head is normocephalic and atraumatic.  NECK:  Supple, no visible swelling.  CARDIOVASCULAR:  No palpable tachycardia.  LUNGS:  No audible wheezing or labored breathing.  ABDOMEN:  Nondistended.  Extremities: Evaluation of left upper extremity finds the patient to have a palpable radial artery at the wrist with brisk capillary refill to all digits. The patient has intact sensorium to axillary, radial, median and ulnar nerves. There are no open wounds. There are no signs of infection. There is no evidence of lymphedema or lymphatic streaking. The patient has supple compartments of the left arm, forearm and hand.  Surgical incisions are well-healed.  She can flex the thumb to approximately the level of the proximal interphalangeal joint of the left small finger.     Imaging/Test Results:  Radiographs of the left thumb show no acute fracture or dislocation.  There is surgical resection trapezium noted.  There is some level of subsidence of the metacarpal, but it is not making luci contact.     Assessment:  Left thumb CMC arthroplasty, 6 weeks out and doing well.     Plan:  Patient can wean from splint immobilization.   Can progress to weightbearing as tolerated under the guidance of formal therapy for strengthening and endurance purposes per protocol.  Additional therapy requisition will be provided.  Progress to 1 to 2 pounds maximum weightbearing to the left upper extremity outside of therapy for the next 4 weeks.  At 10 weeks postop they can progress to activities as tolerated.  Follow-up with the office in 6 weeks for repeat clinical exam, we will get x-rays 3 views of the left thumb upon return.  All questions answered at today's visit.    Aurora Arauz PA-C

## 2024-05-23 NOTE — PROGRESS NOTES
OCCUPATIONAL THERAPY TREATMENT NOTE    Patient Name:  April Dawkins   Patient MRN: [id number]  Date: 5/28/2024  Time Calculation  Start Time: 1500  Stop Time: 1545  Time Calculation (min): 45 min    Insurance:  Visit number: 5 of 6  Insurance Type: Payor: GENERIC COMMERCIAL / Plan: GENERIC COMMERCIAL / Product Type: *No Product type* /   Authorization or Plan of Care date Range: 100v per madeline year   Copay: n/a  Referred by: Erlin Ambriz DO        OT Therapeutic Procedures Time Entry  Manual Therapy Time Entry: 20  Therapeutic Exercise Time Entry: 10  OT Modalities Time Entry  Whirlpool Time Entry: 15                  General:  Reason for visit: L thumb CMC arthroplasty   Referring provider: Erlin Ambriz DO     Assessment:  Progress towards functional goals: Improved mobility in L wrist/thumb and Reduce pain level  Response to interventions:  Bruising noted over surgical scar and anterior wrist. No tenderness with palpation. Therapist suggested patient call for an orthopedic follow up to address thumb post fall. Improved pain and mobility noted after fluidotherapy and manual therapy. Patient noted muscle tightness with CMC RABD but tolerated new thumb AROM exercises. Denied HEP handout, stating she will remember her exercises.  and Tolerated treatment session well without any increase in pain.  Justification for continued skilled care: To address remaining functional, objective and subjective deficits to allow them to return to full independence with ADLs. Modify and progress home exercise program. Skilled intervention required to improve ROM which will improve function. Reduce swelling to improve joint ROM and reduce muscular inhibition.  Visit plan: 1x for 6 weeks    Plan:  Progress exercises as tolerated to improve overall UE strength and performance in daily activities , Activities to improve fine motor coordination and manual dexterity skills  "to improve performance in ADLs and IADLs, Exercises to gradually increase range of motion., Manual therapy to  muscle tightness and improve joint mobility. , and Modalities as needed to address symptoms.    Therapy diagnoses:   1. Joint stiffness  Follow Up In Occupational Therapy      2. Unilateral primary osteoarthritis of first carpometacarpal joint, left hand  Follow Up In Occupational Therapy             Subjective:  April reports she feels like her condition is improving.  Progress towards functional goal:   Patient reports she has a follow up with Dr. Ambriz on 24 who stated the L thumb was healing well. Patient reports she has fallen 2 times since follow up. Notes she has bruising over thumb CMC and anterior wrist. Therapist recommended patient to schedule appointment with Dr. Ambriz to rule out any further injuries. Patient stated she would call after today's session.   Surgery took place on 04/10/24. Patient is 6 weeks post opt   Pain Location L thumb  Pain (0-10): 0   HEP adherence / understanding: compliance with the instructed home exercises.    Precautions:  Fall Risk: None    Precautions listed: thumb arthroplasty protocol     Objective: NP    Treatment Performed: (\"NP\" = Not Performed)     Therapeutic Exercise: 10 mins  - Performed thumb CMC PABD & RABD and MP flex/ext  AROM exercises     Therapeutic Activities: NP  -   Manual Therapy: 20 mins  - STM and scar tissue massage to thenar eminence and surgical scar   - Joint mobilization and light stretching to L thumb    Neuromuscular Re-Education: NP  -   Modalities: 15 mins  - Fluidotherapy with wrist and digit AROM       Education: Added Thumb CMC AROM exercise(s) to HEP program Patient denied handout   "

## 2024-05-28 ENCOUNTER — TREATMENT (OUTPATIENT)
Dept: OCCUPATIONAL THERAPY | Facility: CLINIC | Age: 49
End: 2024-05-28
Payer: COMMERCIAL

## 2024-05-28 DIAGNOSIS — M18.12 UNILATERAL PRIMARY OSTEOARTHRITIS OF FIRST CARPOMETACARPAL JOINT, LEFT HAND: ICD-10-CM

## 2024-05-28 DIAGNOSIS — M25.60 JOINT STIFFNESS: ICD-10-CM

## 2024-05-28 PROCEDURE — 97022 WHIRLPOOL THERAPY: CPT | Mod: GO

## 2024-05-28 PROCEDURE — 97140 MANUAL THERAPY 1/> REGIONS: CPT | Mod: GO

## 2024-05-28 PROCEDURE — 97110 THERAPEUTIC EXERCISES: CPT | Mod: GO

## 2024-05-28 ASSESSMENT — PAIN - FUNCTIONAL ASSESSMENT: PAIN_FUNCTIONAL_ASSESSMENT: 0-10

## 2024-05-28 ASSESSMENT — PAIN SCALES - GENERAL: PAINLEVEL_OUTOF10: 0 - NO PAIN

## 2024-05-28 ASSESSMENT — ENCOUNTER SYMPTOMS
DEPRESSION: 0
OCCASIONAL FEELINGS OF UNSTEADINESS: 0
LOSS OF SENSATION IN FEET: 0

## 2024-05-29 ENCOUNTER — APPOINTMENT (OUTPATIENT)
Dept: PRIMARY CARE | Facility: CLINIC | Age: 49
End: 2024-05-29
Payer: COMMERCIAL

## 2024-05-31 NOTE — PROGRESS NOTES
OCCUPATIONAL THERAPY TREATMENT NOTE    Patient Name:  April Dawkins   Patient MRN: [id number]  Date: 6/3/2024  Time Calculation  Start Time: 0900  Stop Time: 0945  Time Calculation (min): 45 min    Insurance:  Visit number: 6 of 6  Insurance Type: Payor: GENERIC COMMERCIAL / Plan: GENERIC COMMERCIAL / Product Type: *No Product type* /   Authorization or Plan of Care date Range: 100v per madeline year   Copay: n/a  Referred by: Erlin Ambriz DO        OT Therapeutic Procedures Time Entry  Manual Therapy Time Entry: 10  Therapeutic Exercise Time Entry: 20  OT Modalities Time Entry  Whirlpool Time Entry: 15                  General:  Reason for visit: L thumb CMC arthroplasty   Referring provider: Erlin Ambriz DO     Assessment:  Progress towards functional goals: Improved mobility in L wrist/thumb and Reduce pain level  Response to interventions:  Objective measurements were re-assessed indicating functional wrist and thumb CMC AROM. Thumb IP and MP AROM remains decreased. Decreased  strength noted in LUE. Patient has achieved 4/8 goals since initial eval. Patient is 8 weeks post-opt. Will begin strengthening L hand and thumb. Introduced new white theraputty activities to begin improving /pinch strength. Patient notes stress in L II digit during pinching activities. Therapist instructed patient to work up to V digit during opposition activities. Patient verbalized full understanding of new HEP exercises. Will complete 6 additional visits, 2x for 3 weeks, to focus on improving strength and AROM in L thumb.  Justification for continued skilled care: To address remaining functional, objective and subjective deficits to allow them to return to full independence with ADLs. Modify and progress home exercise program. Skilled intervention required to improve ROM which will improve function. Reduce swelling to improve joint ROM and reduce muscular  inhibition.    Plan:  Progress exercises as tolerated to improve overall UE strength and performance in daily activities , Activities to improve fine motor coordination and manual dexterity skills to improve performance in ADLs and IADLs, Exercises to gradually increase range of motion., Manual therapy to  muscle tightness and improve joint mobility. , and Modalities as needed to address symptoms.  6 additional OT visits  2x for 3 weeks    Therapy diagnoses:   1. Joint stiffness  Follow Up In Occupational Therapy    Follow Up In Occupational Therapy      2. Unilateral primary osteoarthritis of first carpometacarpal joint, left hand  Follow Up In Occupational Therapy    Follow Up In Occupational Therapy               Subjective:  April reports she feels like her condition is improving.  Progress towards functional goal:   Patient has worn her forearm based custom splint at work stating it is more supportive than her soft CMC brace. Notes increased stiffness in L thumb.  Surgery took place on 04/10/24. Patient is 8 weeks post opt   Pain Location L thumb  Pain (0-10): 0   HEP adherence / understanding: compliance with the instructed home exercises.    Precautions:  Fall Risk: None    Precautions listed: thumb arthroplasty protocol     Objective:   Active range of motion:  L Wrist:  - Flexion: 70 (+5)  - Extension: 70 (no change)  - Rd dev: 10 (-10)  - Ul dev: 25 (-5)     L Thumb:  - CMC PABD: 50 (+10)  - CMC RABD: 50 (+5)  - MP ext: 0  - MP flex: 35 (no change)  - IP ext: 0  - IP flex: 45 (no change)      strength:  - RUE : 60# (+12#)  - LUE : 30# (-9#)      Pinch Strength:  - Lateral:       - RUE: 14# (+3#)        - LUE: 8# (+3#)  - 3 Jaw:       - RUE: 14# (+5#)        - LUE: 9# (+3#)     Outcome Measures:  OT Adult Other Outcome Measures:   9 Hole Peg Test: R: 13.7 secs (1 sec improvement)      L: 17.1 secs (4.9 secs improvement)  OT Adult Other Outcome Measures  Other Outcome Measures: 26 (4  "point improvement)  (QuickDASH)  (34.09%)        Goals:  1) Patient will report pain free use of hand for completing ADLs/IADLS by discharge. Met  2) Patient to increase Thumb MP flexion AROM by 10 degrees in order to improve independent performance in daily activities. Progressing   3) Patient will improve L  strength by 10lb to improve performance in lifting and grasping tasks. Progressing   4) Patient to improve L hand coordination on 9 hole peg test to <20 secs for dexterity tasks such as typing, buttoning, and zipping. Met  5) Patient to improve QuickDASH score to at or below 20% to increase independency in ADLs and IADLs. Progressing  6) Patient will report understanding of home program, demonstrate independence and verbalize precautions. Met  7) Patient will report follow through with wearing of orthosis as instructed by therapist. Met  8) Patient's scar will be supple and demonstrate good healing that does not limit function by discharge. Progressing     Goals as of 06/03/24:  1) Patient to increase Thumb MP flexion AROM by 10 degrees in order to improve independent performance in daily activities.   2) Patient will improve L  strength by 10lb to improve performance in lifting and grasping tasks.   3) Patient's scar will be supple and demonstrate good healing that does not limit function by discharge.   4) Patient to improve QuickDASH score to at or below 20% to increase independency in ADLs and IADLs.  5) Patient to increase wrist radial deviation AROM by 10 degrees in order to improve independent performance in daily activities.     Treatment Performed: (\"NP\" = Not Performed)     Therapeutic Exercise: 20 mins  - Reassessed objective measurements  - Reviewed HEP exercises  - Discussed goal achievement and POC  - White theraputty activities; provided HEP handout and white theraputty      - mass grasp      - alternating finger pinch      - alternating finger pinch and pull     Therapeutic Activities: " NP    Manual Therapy: 10 mins  - STM and scar tissue massage to thenar eminence and surgical scar   - Joint mobilization and light stretching to L thumb    Neuromuscular Re-Education: NP  -   Modalities: 15 mins  - Fluidotherapy with wrist and digit AROM       Education: Added theraputty exercise(s) to HEP program Access Code [email or web address]

## 2024-06-03 ENCOUNTER — TREATMENT (OUTPATIENT)
Dept: OCCUPATIONAL THERAPY | Facility: CLINIC | Age: 49
End: 2024-06-03
Payer: COMMERCIAL

## 2024-06-03 ENCOUNTER — APPOINTMENT (OUTPATIENT)
Dept: OCCUPATIONAL THERAPY | Facility: CLINIC | Age: 49
End: 2024-06-03
Payer: COMMERCIAL

## 2024-06-03 DIAGNOSIS — M25.60 JOINT STIFFNESS: ICD-10-CM

## 2024-06-03 DIAGNOSIS — M18.12 UNILATERAL PRIMARY OSTEOARTHRITIS OF FIRST CARPOMETACARPAL JOINT, LEFT HAND: ICD-10-CM

## 2024-06-03 PROCEDURE — 97022 WHIRLPOOL THERAPY: CPT | Mod: GO

## 2024-06-03 PROCEDURE — 97110 THERAPEUTIC EXERCISES: CPT | Mod: GO

## 2024-06-03 PROCEDURE — 97140 MANUAL THERAPY 1/> REGIONS: CPT | Mod: GO

## 2024-06-03 ASSESSMENT — PAIN - FUNCTIONAL ASSESSMENT: PAIN_FUNCTIONAL_ASSESSMENT: 0-10

## 2024-06-03 ASSESSMENT — PAIN SCALES - GENERAL: PAINLEVEL_OUTOF10: 0 - NO PAIN

## 2024-06-06 NOTE — PROGRESS NOTES
OCCUPATIONAL THERAPY TREATMENT NOTE    Patient Name:  April Dawkins   Patient MRN: [id number]  Date: 6/10/2024  Time Calculation  Start Time: 0945  Stop Time: 1030  Time Calculation (min): 45 min    Insurance:  Visit number: 7 of 12  Insurance Type: Payor: GENERIC COMMERCIAL / Plan: GENERIC COMMERCIAL / Product Type: *No Product type* /   Authorization or Plan of Care date Range: 100v per madeline year   Copay: n/a  Referred by: Erlin Ambriz DO        OT Therapeutic Procedures Time Entry  Manual Therapy Time Entry: 5  Therapeutic Activity Time Entry: 15  OT Modalities Time Entry  Ultrasound Time Entry: 10  Whirlpool Time Entry: 15                  General:  Reason for visit: L thumb CMC arthroplasty   Referring provider: Erlin Ambriz DO     Assessment:  Progress towards functional goals: Improved mobility in L wrist/thumb and Reduce pain level  Response to interventions:  Fluidotherapy, ultrasound, and manual therapy performed to decrease muscle tightness and pain to improve performance during fine motor activities. Notes decreased pain after modalities. Introduced fine motor activities during this session. Patient demonstrated mild difficulties with in-hand manipulation during translation activities. Dropped pegs frequently d/t difficulties with thumb opposition. Will continues to perform and  progress fine motor activities.  and Demonstrated increased fine motor control during therapeutic activities.   Justification for continued skilled care: To address remaining functional, objective and subjective deficits to allow them to return to full independence with ADLs. Modify and progress home exercise program. Skilled intervention required to improve ROM which will improve function. Reduce swelling to improve joint ROM and reduce muscular inhibition.    Plan:  Progress exercises as tolerated to improve overall UE strength and performance in daily  "activities , Activities to improve fine motor coordination and manual dexterity skills to improve performance in ADLs and IADLs, Exercises to gradually increase range of motion., Manual therapy to  muscle tightness and improve joint mobility. , and Modalities as needed to address symptoms.    Therapy diagnoses:   1. Joint stiffness  Follow Up In Occupational Therapy      2. Unilateral primary osteoarthritis of first carpometacarpal joint, left hand  Follow Up In Occupational Therapy        Subjective:  April reports she feels like her condition is improving.  Progress towards functional goal:   Patient reports she cleaned her basement this weekend and notes some soreness to the L thumb. She wore her hand-based CMC brace during cleaning tasks. Notes she still has tightness in L thumb. Continues to have mild difficulties with thumb opposition with index finger during theraputty exercises. Will be 10 weeks post-opt next week and plans on trying to ride her own motorcycle.    Surgery took place on 04/10/24. Patient is 9 weeks post opt   Pain Location L thumb  Pain (0-10): 0   HEP adherence / understanding: compliance with the instructed home exercises.    Precautions:  Fall Risk: None    Precautions listed: thumb arthroplasty protocol     Objective: NP    Treatment Performed: (\"NP\" = Not Performed)     Therapeutic Exercise: NP    Therapeutic Activities: 15 mins  -Purdue pegboard       - Pinch, place, and remove pegs 1 at a time        - Pinch, place, and remove pegs with translation     Manual Therapy: 5 mins  - STM and scar tissue massage to thenar eminence and surgical scar   - Joint mobilization and light stretching to L thumb    Neuromuscular Re-Education: NP  -   Modalities: 25 mins  - Fluidotherapy with wrist and digit AROM- 15 mins  - Ultrasound to L thumb 3Mhz, 1.0W/cm2, continuous 10 min     Education: Reviewed home exercise program.   "

## 2024-06-10 ENCOUNTER — TREATMENT (OUTPATIENT)
Dept: OCCUPATIONAL THERAPY | Facility: CLINIC | Age: 49
End: 2024-06-10
Payer: COMMERCIAL

## 2024-06-10 ENCOUNTER — APPOINTMENT (OUTPATIENT)
Dept: OCCUPATIONAL THERAPY | Facility: CLINIC | Age: 49
End: 2024-06-10
Payer: COMMERCIAL

## 2024-06-10 DIAGNOSIS — M18.12 UNILATERAL PRIMARY OSTEOARTHRITIS OF FIRST CARPOMETACARPAL JOINT, LEFT HAND: ICD-10-CM

## 2024-06-10 DIAGNOSIS — M25.60 JOINT STIFFNESS: ICD-10-CM

## 2024-06-10 PROCEDURE — 97022 WHIRLPOOL THERAPY: CPT | Mod: GO

## 2024-06-10 PROCEDURE — 97035 APP MDLTY 1+ULTRASOUND EA 15: CPT | Mod: GO

## 2024-06-10 PROCEDURE — 97530 THERAPEUTIC ACTIVITIES: CPT | Mod: GO

## 2024-06-10 ASSESSMENT — PAIN - FUNCTIONAL ASSESSMENT: PAIN_FUNCTIONAL_ASSESSMENT: 0-10

## 2024-06-10 ASSESSMENT — PAIN SCALES - GENERAL: PAINLEVEL_OUTOF10: 0 - NO PAIN

## 2024-06-12 NOTE — PROGRESS NOTES
OCCUPATIONAL THERAPY TREATMENT NOTE    Patient Name:  April Dawkins   Patient MRN: [id number]  Date: 2024       Insurance:  Visit number:   Insurance Type: Payor: GENERIC COMMERCIAL / Plan: GENERIC COMMERCIAL / Product Type: *No Product type* /   Authorization or Plan of Care date Range: 100v per madeline year   Copay: n/a  Referred by: Erlin Ambriz DO                              General:  Reason for visit: L thumb CMC arthroplasty   Referring provider: Erlin Ambriz DO     Assessment:  Progress towards functional goals: Improved mobility in L wrist/thumb and Reduce pain level  Response to interventions:  Fluidotherapy, ultrasound, and manual therapy performed to decrease muscle tightness and pain to improve performance during fine motor activities. Notes decreased pain after modalities. Introduced fine motor activities during this session. Patient demonstrated mild difficulties with in-hand manipulation during translation activities. Dropped pegs frequently d/t difficulties with thumb opposition. Will continues to perform and  progress fine motor activities.  and Demonstrated increased fine motor control during therapeutic activities.   Justification for continued skilled care: To address remaining functional, objective and subjective deficits to allow them to return to full independence with ADLs. Modify and progress home exercise program. Skilled intervention required to improve ROM which will improve function. Reduce swelling to improve joint ROM and reduce muscular inhibition.    Plan:  Progress exercises as tolerated to improve overall UE strength and performance in daily activities , Activities to improve fine motor coordination and manual dexterity skills to improve performance in ADLs and IADLs, Exercises to gradually increase range of motion., Manual therapy to  muscle tightness and improve joint mobility. , and  "Modalities as needed to address symptoms.    Therapy diagnoses:   No diagnosis found.    Subjective:  April reports she feels like her condition is improving.  Progress towards functional goal:   Patient reports she cleaned her basement this weekend and notes some soreness to the L thumb. She wore her hand-based CMC brace during cleaning tasks. Notes she still has tightness in L thumb. Continues to have mild difficulties with thumb opposition with index finger during theraputty exercises. Will be 10 weeks post-opt next week and plans on trying to ride her own motorcycle.    Surgery took place on 04/10/24. Patient is 9 weeks post opt   Pain Location L thumb  Pain (0-10): 0   HEP adherence / understanding: compliance with the instructed home exercises.    Precautions:  Fall Risk: None    Precautions listed: thumb arthroplasty protocol     Objective: NP    Treatment Performed: (\"NP\" = Not Performed)     Therapeutic Exercise: NP    Therapeutic Activities: 15 mins  -Purdue pegboard       - Pinch, place, and remove pegs 1 at a time        - Pinch, place, and remove pegs with translation     Manual Therapy: 5 mins  - STM and scar tissue massage to thenar eminence and surgical scar   - Joint mobilization and light stretching to L thumb    Neuromuscular Re-Education: NP  -   Modalities: 25 mins  - Fluidotherapy with wrist and digit AROM- 15 mins  - Ultrasound to L thumb 3Mhz, 1.0W/cm2, continuous 10 min     Education: Reviewed home exercise program.   "

## 2024-06-13 ENCOUNTER — APPOINTMENT (OUTPATIENT)
Dept: OCCUPATIONAL THERAPY | Facility: CLINIC | Age: 49
End: 2024-06-13
Payer: COMMERCIAL

## 2024-06-13 ENCOUNTER — DOCUMENTATION (OUTPATIENT)
Dept: OCCUPATIONAL THERAPY | Facility: CLINIC | Age: 49
End: 2024-06-13
Payer: COMMERCIAL

## 2024-06-13 NOTE — PROGRESS NOTES
Occupational Therapy                 Therapy Communication Note    Patient Name: April Dawkins  MRN: [id number]  Today's Date: 6/13/2024     Discipline: Occupational Therapy    Missed Time: Cancel    Comment: Patient no showed, no called today's session. Therapist called patient to remind her of her missed appointment. Patient stated she forgot about her appointment today. Therapist reminded patient of new next OT session on 06/18/24.

## 2024-06-14 NOTE — PROGRESS NOTES
OCCUPATIONAL THERAPY TREATMENT NOTE    Patient Name:  April Dawkins   Patient MRN: [id number]  Date: 6/18/2024  Time Calculation  Start Time: 1630  Stop Time: 1708  Time Calculation (min): 38 min    Insurance:  Visit number: 8 of 12  Insurance Type: Payor: GENERIC COMMERCIAL / Plan: GENERIC COMMERCIAL / Product Type: *No Product type* /   Authorization or Plan of Care date Range: 100v per madeline year   Copay: n/a  Referred by: Erlin Ambriz DO        OT Therapeutic Procedures Time Entry  Manual Therapy Time Entry: 10  Therapeutic Activity Time Entry: 18  OT Modalities Time Entry  Ultrasound Time Entry: 10                **Session ended 7 mins early d/t increased thumb soreness and patient having another commitment**     General:  Reason for visit: L thumb CMC arthroplasty   Referring provider: Erlin Ambriz DO     Assessment:  Progress towards functional goals: Improved mobility in L wrist/thumb, Improve  and pinch strength, Improved fine motor coordination and manual dexterity skills, Improved performance in daily activities , and Reduce pain level  Response to interventions:  Patient requested to not complete fluidotherapy. Improved mobility noted in L thumb after stretches. Introduced BTE activities to begin improving /pinch strength. Tolerated all activities with mild soreness in L thumb. Discontinued less set of pinch trials d/t severe weakness in L thumb. Manipulated tweezers well during [employer]ir tweezers pegboard, however notes severe weakness in L thumb. Will continue to progress strengthening of L thumb/hand. , Improved tolerance to strengthening progressions., Tolerated increased resistance during BTE activities. , and Demonstrated increased fine motor control during therapeutic activities.   Justification for continued skilled care: To address remaining functional, objective and subjective deficits to allow them to return to full  "independence with ADLs. Modify and progress home exercise program. Skilled intervention required to improve ROM which will improve function. Reduce swelling to improve joint ROM and reduce muscular inhibition.    Plan:  Progress exercises as tolerated to improve overall UE strength and performance in daily activities , Activities to improve fine motor coordination and manual dexterity skills to improve performance in ADLs and IADLs, Exercises to gradually increase range of motion., Manual therapy to  muscle tightness and improve joint mobility. , and Modalities as needed to address symptoms.    Therapy diagnoses:   1. Joint stiffness  Follow Up In Occupational Therapy      2. Unilateral primary osteoarthritis of first carpometacarpal joint, left hand  Follow Up In Occupational Therapy          Subjective:  April reports she feels like her condition is improving.  Progress towards functional goal:   Patient reports on Friday she rode her motor cycle to work with no pain. After using her hands at work, she had severe soreness in L thumb, making riding her motorcycle home difficult. She dons brace when her thumb becomes sore but is challenging herself to use L thumb more often.  Surgery took place on 04/10/24. Patient is 10 weeks post opt   Pain Location L thumb  Pain (0-10): 3   HEP adherence / understanding: compliance with the instructed home exercises.    Last surgery: Left Thumb Carpometacarpal Arthroplasty With Possible Tendon Transfer - Left  Date of last surgery: 4/10/2024  Days since last surgery: 69    Precautions:  Fall Risk: None    Precautions listed: thumb arthroplasty protocol     Objective: NP    Treatment Performed: (\"NP\" = Not Performed)     Therapeutic Exercise: NP    Therapeutic Activities: 18 mins  -   trials 30%, 40%, 50%; 90 secs each   -  Pinch trials: 1.8T, 2.8T, 3.8T (discontinued at 40 secs); 90 secs each  - Pinch, place, and remove pegs with tweezers in square " formation during [employer]ir tweezers pegboard        Manual Therapy: 10 mins  - Joint mobilization and light stretching to L thumb    Neuromuscular Re-Education: NP  -   Modalities: 10 mins  - Ultrasound to L thumb 3Mhz, 1.0W/cm2, continuous 10 min     Education: Reviewed home exercise program.

## 2024-06-17 ENCOUNTER — APPOINTMENT (OUTPATIENT)
Dept: ORTHOPEDIC SURGERY | Facility: CLINIC | Age: 49
End: 2024-06-17
Payer: COMMERCIAL

## 2024-06-18 ENCOUNTER — TREATMENT (OUTPATIENT)
Dept: OCCUPATIONAL THERAPY | Facility: CLINIC | Age: 49
End: 2024-06-18
Payer: COMMERCIAL

## 2024-06-18 DIAGNOSIS — M25.60 JOINT STIFFNESS: ICD-10-CM

## 2024-06-18 DIAGNOSIS — M18.12 UNILATERAL PRIMARY OSTEOARTHRITIS OF FIRST CARPOMETACARPAL JOINT, LEFT HAND: ICD-10-CM

## 2024-06-18 PROCEDURE — 97530 THERAPEUTIC ACTIVITIES: CPT | Mod: GO

## 2024-06-18 PROCEDURE — 97140 MANUAL THERAPY 1/> REGIONS: CPT | Mod: GO

## 2024-06-18 PROCEDURE — 97035 APP MDLTY 1+ULTRASOUND EA 15: CPT | Mod: GO

## 2024-06-18 ASSESSMENT — PAIN - FUNCTIONAL ASSESSMENT: PAIN_FUNCTIONAL_ASSESSMENT: 0-10

## 2024-06-18 ASSESSMENT — PAIN SCALES - GENERAL: PAINLEVEL_OUTOF10: 3

## 2024-06-18 NOTE — PROGRESS NOTES
OCCUPATIONAL THERAPY TREATMENT NOTE    Patient Name:  April Dawkins   Patient MRN: [id number]  Date: 6/20/2024       Insurance:  Visit number: 9 of 12  Insurance Type: Payor: GENERIC COMMERCIAL / Plan: GENERIC COMMERCIAL / Product Type: *No Product type* /   Authorization or Plan of Care date Range: 100v per madeline year   Copay: n/a  Referred by: Erlin Ambriz DO                               General:  Reason for visit: L thumb CMC arthroplasty   Referring provider: Erlin Ambriz DO     Assessment:  Progress towards functional goals: Improved mobility in L wrist/thumb, Improve  and pinch strength, Improved fine motor coordination and manual dexterity skills, Improved performance in daily activities , and Reduce pain level  Response to interventions:  Patient requested to not complete fluidotherapy. Improved mobility noted in L thumb after stretches. Introduced BTE activities to begin improving /pinch strength. Tolerated all activities with mild soreness in L thumb. Discontinued less set of pinch trials d/t severe weakness in L thumb. Manipulated tweezers well during [employer]ir tweezers pegboard, however notes severe weakness in L thumb. Will continue to progress strengthening of L thumb/hand. , Improved tolerance to strengthening progressions., Tolerated increased resistance during BTE activities. , and Demonstrated increased fine motor control during therapeutic activities.   Justification for continued skilled care: To address remaining functional, objective and subjective deficits to allow them to return to full independence with ADLs. Modify and progress home exercise program. Skilled intervention required to improve ROM which will improve function. Reduce swelling to improve joint ROM and reduce muscular inhibition.    Plan:  Progress exercises as tolerated to improve overall UE strength and performance in daily activities , Activities to  "improve fine motor coordination and manual dexterity skills to improve performance in ADLs and IADLs, Exercises to gradually increase range of motion., Manual therapy to  muscle tightness and improve joint mobility. , and Modalities as needed to address symptoms.    Therapy diagnoses:   No diagnosis found.      Subjective:  April reports she feels like her condition is improving.  Progress towards functional goal:   Patient reports on Friday she rode her motor cycle to work with no pain. After using her hands at work, she had severe soreness in L thumb, making riding her motorcycle home difficult. She dons brace when her thumb becomes sore but is challenging herself to use L thumb more often.  Surgery took place on 04/10/24. Patient is 11 weeks post opt   Pain Location L thumb  Pain (0-10): 3   HEP adherence / understanding: compliance with the instructed home exercises.    Last surgery: Left Thumb Carpometacarpal Arthroplasty With Possible Tendon Transfer - Left  Date of last surgery: 4/10/2024  Days since last surgery: 71    Precautions:  Fall Risk: None    Precautions listed: thumb arthroplasty protocol     Objective: NP    Treatment Performed: (\"NP\" = Not Performed)     Therapeutic Exercise: NP    Therapeutic Activities: 18 mins  -   trials 30%, 40%, 50%; 90 secs each   -  Pinch trials: 1.8T, 2.8T, 3.8T (discontinued at 40 secs); 90 secs each  - Pinch, place, and remove pegs with tweezers in square formation during O'[employer]ir tweezers pegboard        Manual Therapy: 10 mins  - Joint mobilization and light stretching to L thumb    Neuromuscular Re-Education: NP  -   Modalities: 10 mins  - Ultrasound to L thumb 3Mhz, 1.0W/cm2, continuous 10 min     Education: Reviewed home exercise program.   "

## 2024-06-20 ENCOUNTER — APPOINTMENT (OUTPATIENT)
Dept: OCCUPATIONAL THERAPY | Facility: CLINIC | Age: 49
End: 2024-06-20
Payer: COMMERCIAL

## 2024-06-20 ENCOUNTER — DOCUMENTATION (OUTPATIENT)
Dept: OCCUPATIONAL THERAPY | Facility: CLINIC | Age: 49
End: 2024-06-20
Payer: COMMERCIAL

## 2024-06-20 NOTE — PROGRESS NOTES
OCCUPATIONAL THERAPY TREATMENT NOTE    Patient Name:  April Dawkins   Patient MRN: [id number]  Date: 6/24/2024  Time Calculation  Start Time: 1115  Stop Time: 1200  Time Calculation (min): 45 min    Insurance:  Visit number: 9 of 12  Insurance Type: Payor: GENERIC COMMERCIAL / Plan: GENERIC COMMERCIAL / Product Type: *No Product type* /   Authorization or Plan of Care date Range: 100v per madeline year   Copay: n/a  Referred by: Erlin Ambriz DO        OT Therapeutic Procedures Time Entry  Manual Therapy Time Entry: 22  OT Modalities Time Entry  Ultrasound Time Entry: 10  Whirlpool Time Entry: 13                 General:  Reason for visit: L thumb CMC arthroplasty   Referring provider: Erlin Ambriz DO     Assessment:  Progress towards functional goals: Improved mobility in L wrist/thumb, Improve  and pinch strength, Improved fine motor coordination and manual dexterity skills, Improved performance in daily activities , and Reduce pain level  Response to interventions:  Session focused on pain reduction and desensitization to surgical scar. Tolerated massage and towel desensitization to scar with only mild sensitivity. Mild soreness with stretches. Thumb AROM is WNL. Provided scar patch to decrease keloiding and sensitivity to surgical scar when wearing thumb brace. Patient verbalized full understanding of donning and doffing scar patch.   Justification for continued skilled care: To address remaining functional, objective and subjective deficits to allow them to return to full independence with ADLs. Modify and progress home exercise program. Skilled intervention required to improve ROM which will improve function. Reduce swelling to improve joint ROM and reduce muscular inhibition.    Plan:  Progress exercises as tolerated to improve overall UE strength and performance in daily activities , Activities to improve fine motor coordination and manual  "dexterity skills to improve performance in ADLs and IADLs, Exercises to gradually increase range of motion., Manual therapy to  muscle tightness and improve joint mobility. , and Modalities as needed to address symptoms.    Therapy diagnoses:   1. Joint stiffness  Follow Up In Occupational Therapy      2. Unilateral primary osteoarthritis of first carpometacarpal joint, left hand  Follow Up In Occupational Therapy          Subjective:  April reports she feels like her condition is not changing.  Progress towards functional goal:   Patient reports she has been experiencing increased pain, soreness, and sensitivity over L thumb within the past 2 days. Notes she used her thumb a lot at work on Saturday. Has been wearing a silicone wrap under thumb spica brace to decrease sensitivity and irritation.    Pain Location L thumb  Pain (0-10): 8   HEP adherence / understanding: compliance with the instructed home exercises.    Last surgery: Left Thumb Carpometacarpal Arthroplasty With Possible Tendon Transfer - Left  Date of last surgery: 4/10/2024  Days since last surgery: 75    Precautions:  Fall Risk: None    Precautions listed: thumb arthroplasty protocol     Objective: NP    Treatment Performed: (\"NP\" = Not Performed)     Therapeutic Exercise: NP    Therapeutic Activities: NP     Manual Therapy: 22 mins  - Joint mobilization and light stretching to L thumb  - STM and scar massage to surgical scar   - Towel rubs over surgical scar for desensitization   - Provided scar patch for surgical scar.     Neuromuscular Re-Education: NP  -   Modalities: 23 mins  - Ultrasound to L thumb 3Mhz, 1.0W/cm2, continuous 10 min   - Fluidotherapy with wrist and digit AROM- 13mins    Education: Reviewed home exercise program.   "

## 2024-06-20 NOTE — PROGRESS NOTES
Occupational Therapy                 Therapy Communication Note    Patient Name: April Dawkins  MRN: [id number]  Today's Date: 6/20/2024     Discipline: Occupational Therapy    Missed Time: Cancel    Comment: Patient cancelled OT session via MyChart. Next scheduled OT session is on 06/24/24.

## 2024-06-24 ENCOUNTER — TREATMENT (OUTPATIENT)
Dept: OCCUPATIONAL THERAPY | Facility: CLINIC | Age: 49
End: 2024-06-24
Payer: COMMERCIAL

## 2024-06-24 DIAGNOSIS — M25.60 JOINT STIFFNESS: ICD-10-CM

## 2024-06-24 DIAGNOSIS — M18.12 UNILATERAL PRIMARY OSTEOARTHRITIS OF FIRST CARPOMETACARPAL JOINT, LEFT HAND: ICD-10-CM

## 2024-06-24 PROCEDURE — 97022 WHIRLPOOL THERAPY: CPT | Mod: GO

## 2024-06-24 PROCEDURE — 97035 APP MDLTY 1+ULTRASOUND EA 15: CPT | Mod: GO

## 2024-06-24 PROCEDURE — 97140 MANUAL THERAPY 1/> REGIONS: CPT | Mod: GO

## 2024-06-24 ASSESSMENT — PAIN - FUNCTIONAL ASSESSMENT: PAIN_FUNCTIONAL_ASSESSMENT: 0-10

## 2024-06-24 ASSESSMENT — PAIN SCALES - GENERAL: PAINLEVEL_OUTOF10: 8

## 2024-06-25 NOTE — PROGRESS NOTES
OCCUPATIONAL THERAPY TREATMENT NOTE    Patient Name:  April Dawkins   Patient MRN: [id number]  Date: 2024       Insurance:  Visit number: 10 of 12  Insurance Type: Payor: GENERIC COMMERCIAL / Plan: GENERIC COMMERCIAL / Product Type: *No Product type* /   Authorization or Plan of Care date Range: 100v per madeline year   Copay: n/a  Referred by: Erlin Ambriz DO                             General:  Reason for visit: L thumb CMC arthroplasty   Referring provider: Erlin Ambriz DO     Assessment:  Progress towards functional goals: Improved mobility in L wrist/thumb, Improve  and pinch strength, Improved fine motor coordination and manual dexterity skills, Improved performance in daily activities , and Reduce pain level  Response to interventions:  Session focused on pain reduction and desensitization to surgical scar. Tolerated massage and towel desensitization to scar with only mild sensitivity. Mild soreness with stretches. Thumb AROM is WNL. Provided scar patch to decrease keloiding and sensitivity to surgical scar when wearing thumb brace. Patient verbalized full understanding of donning and doffing scar patch.   Justification for continued skilled care: To address remaining functional, objective and subjective deficits to allow them to return to full independence with ADLs. Modify and progress home exercise program. Skilled intervention required to improve ROM which will improve function. Reduce swelling to improve joint ROM and reduce muscular inhibition.    Plan:  Progress exercises as tolerated to improve overall UE strength and performance in daily activities , Activities to improve fine motor coordination and manual dexterity skills to improve performance in ADLs and IADLs, Exercises to gradually increase range of motion., Manual therapy to  muscle tightness and improve joint mobility. , and Modalities as needed to address  "symptoms.    Therapy diagnoses:   No diagnosis found.      Subjective:  April reports she feels like her condition is not changing.  Progress towards functional goal:   Patient reports she has been experiencing increased pain, soreness, and sensitivity over L thumb within the past 2 days. Notes she used her thumb a lot at work on Saturday. Has been wearing a silicone wrap under thumb spica brace to decrease sensitivity and irritation.    Pain Location L thumb  Pain (0-10): 8   HEP adherence / understanding: compliance with the instructed home exercises.    Last surgery: Left Thumb Carpometacarpal Arthroplasty With Possible Tendon Transfer - Left  Date of last surgery: 4/10/2024  Days since last surgery: 78    Precautions:  Fall Risk: None    Precautions listed: thumb arthroplasty protocol     Objective: NP    Treatment Performed: (\"NP\" = Not Performed)     Therapeutic Exercise: NP    Therapeutic Activities: NP     Manual Therapy: 22 mins  - Joint mobilization and light stretching to L thumb  - STM and scar massage to surgical scar   - Towel rubs over surgical scar for desensitization   - Provided scar patch for surgical scar.     Neuromuscular Re-Education: NP  -   Modalities: 23 mins  - Ultrasound to L thumb 3Mhz, 1.0W/cm2, continuous 10 min   - Fluidotherapy with wrist and digit AROM- 13mins    Education: Reviewed home exercise program.   "

## 2024-06-27 ENCOUNTER — APPOINTMENT (OUTPATIENT)
Dept: OCCUPATIONAL THERAPY | Facility: CLINIC | Age: 49
End: 2024-06-27
Payer: COMMERCIAL

## 2024-06-27 ENCOUNTER — DOCUMENTATION (OUTPATIENT)
Dept: OCCUPATIONAL THERAPY | Facility: CLINIC | Age: 49
End: 2024-06-27
Payer: COMMERCIAL

## 2024-06-27 NOTE — PROGRESS NOTES
Occupational Therapy                 Therapy Communication Note    Patient Name: April Dawkins  MRN: [id number]  Today's Date: 6/27/2024     Discipline: Occupational Therapy    Missed Time: Cancel    Comment: Patient called the clinic to cancel today's OT appointment d/t work. VM was left for patient to schedule more OT sessions.

## 2024-07-01 ENCOUNTER — HOSPITAL ENCOUNTER (OUTPATIENT)
Dept: RADIOLOGY | Facility: CLINIC | Age: 49
Discharge: HOME | End: 2024-07-01
Payer: COMMERCIAL

## 2024-07-01 ENCOUNTER — OFFICE VISIT (OUTPATIENT)
Dept: ORTHOPEDIC SURGERY | Facility: CLINIC | Age: 49
End: 2024-07-01
Payer: COMMERCIAL

## 2024-07-01 VITALS — HEIGHT: 64 IN | WEIGHT: 174 LBS | BODY MASS INDEX: 29.71 KG/M2

## 2024-07-01 DIAGNOSIS — M19.049 CMC ARTHRITIS: ICD-10-CM

## 2024-07-01 DIAGNOSIS — M19.049 CMC ARTHRITIS: Primary | ICD-10-CM

## 2024-07-01 PROCEDURE — 73140 X-RAY EXAM OF FINGER(S): CPT | Mod: LEFT SIDE | Performed by: RADIOLOGY

## 2024-07-01 PROCEDURE — 3008F BODY MASS INDEX DOCD: CPT | Performed by: ORTHOPAEDIC SURGERY

## 2024-07-01 PROCEDURE — 1036F TOBACCO NON-USER: CPT | Performed by: ORTHOPAEDIC SURGERY

## 2024-07-01 PROCEDURE — 99024 POSTOP FOLLOW-UP VISIT: CPT | Performed by: ORTHOPAEDIC SURGERY

## 2024-07-01 PROCEDURE — 73140 X-RAY EXAM OF FINGER(S): CPT | Mod: LT

## 2024-07-01 ASSESSMENT — PAIN - FUNCTIONAL ASSESSMENT: PAIN_FUNCTIONAL_ASSESSMENT: NO/DENIES PAIN

## 2024-07-01 ASSESSMENT — PATIENT HEALTH QUESTIONNAIRE - PHQ9
1. LITTLE INTEREST OR PLEASURE IN DOING THINGS: NOT AT ALL
2. FEELING DOWN, DEPRESSED OR HOPELESS: NOT AT ALL
SUM OF ALL RESPONSES TO PHQ9 QUESTIONS 1 AND 2: 0

## 2024-07-01 NOTE — PROGRESS NOTES
7/1/2024    Chief Complaint   Patient presents with    Left Hand - Follow-up     Thumb cmc arthroplasty  DOS: 4/10/24  Xrays today        History of Present Illness:  Patient April Dawkins , 48 y.o. female, presents today, 7/1/2024, for evaluation of left thumb  CMC arthroplasty, 11 and half weeks postop.  Patient is done well in the interim since surgery.  She has been working with therapy on motion recovery as well as endurance and strengthening now.  She is weaned from all bracing.  Overall she feels she is making good progress but she does feel she has some occasional tightness soreness and cramping spasm sensation in the thumb and wrist but this does seem to be dissipating with time .         Review of Systems:   GENERAL: Negative  GI: Negative  MUSCULOSKELETAL: See HPI  SKIN: Negative  NEURO:  Negative     Physical Exam:  GENERAL:  Alert and oriented to person, place, and time.  No acute distress and breathing comfortably; pleasant and cooperative with the examination.  HEENT:  Head is normocephalic and atraumatic.  NECK:  Supple, no visible swelling.  CARDIOVASCULAR:  No palpable tachycardia.  LUNGS:  No audible wheezing or labored breathing.  ABDOMEN:  Nondistended.  Extremities: Evaluation of left upper extremity finds the patient to have a palpable radial artery at the wrist with brisk capillary refill to all digits. The patient has intact sensorium to axillary, radial, median and ulnar nerves. There are no open wounds. There are no signs of infection. There is no evidence of lymphedema or lymphatic streaking. The patient has supple compartments of the left arm, forearm and hand.  Surgical incision is well-healed.  She can comfortably flex the thumb to level the A1 pulley of the left small finger.     Imaging/Test Results:  3 views left thumb taken in the office show no acute fracture or dislocation.  Adequate alignment all planes.     Assessment:  Left thumb CMC arthroplasty, 11 and half weeks  postop doing well.     Plan:  Progressive weightbearing as tolerated to the left upper extremity.  Continue with weightbearing activities as tolerated.  Follow-up with our office in as-needed basis.  All questions answered at today's visit.    Aurora Arauz PA-C

## 2024-07-08 ENCOUNTER — TELEPHONE (OUTPATIENT)
Dept: PRIMARY CARE | Facility: CLINIC | Age: 49
End: 2024-07-08
Payer: COMMERCIAL

## 2024-07-18 ENCOUNTER — OFFICE VISIT (OUTPATIENT)
Dept: OTOLARYNGOLOGY | Facility: CLINIC | Age: 49
End: 2024-07-18
Payer: COMMERCIAL

## 2024-07-18 VITALS
DIASTOLIC BLOOD PRESSURE: 87 MMHG | TEMPERATURE: 97.3 F | BODY MASS INDEX: 29.57 KG/M2 | HEIGHT: 64 IN | WEIGHT: 173.2 LBS | SYSTOLIC BLOOD PRESSURE: 130 MMHG

## 2024-07-18 DIAGNOSIS — J31.2 CHRONIC SORE THROAT: Primary | ICD-10-CM

## 2024-07-18 DIAGNOSIS — K21.9 LARYNGOPHARYNGEAL REFLUX (LPR): ICD-10-CM

## 2024-07-18 PROCEDURE — 3008F BODY MASS INDEX DOCD: CPT | Performed by: OTOLARYNGOLOGY

## 2024-07-18 PROCEDURE — 31575 DIAGNOSTIC LARYNGOSCOPY: CPT | Performed by: OTOLARYNGOLOGY

## 2024-07-18 PROCEDURE — 1036F TOBACCO NON-USER: CPT | Performed by: OTOLARYNGOLOGY

## 2024-07-18 PROCEDURE — 99204 OFFICE O/P NEW MOD 45 MIN: CPT | Performed by: OTOLARYNGOLOGY

## 2024-07-18 RX ORDER — OMEPRAZOLE 20 MG/1
CAPSULE, DELAYED RELEASE ORAL
Qty: 180 CAPSULE | Refills: 3 | Status: SHIPPED | OUTPATIENT
Start: 2024-07-18

## 2024-07-18 NOTE — PROGRESS NOTES
Impression:  1. Chronic sore throat        2. Laryngopharyngeal reflux (LPR)             RECOMMENDATIONS/PLAN :  I explained to the patient that her arytenoids still show mild erythema from her previous biopsy sites and she still has significant swelling along the back of her larynx from silent reflux/laryngopharyngeal reflux.  I want her to continue to drink plenty of water and avoid excessive caffeine.  We will switch her omeprazole to 1 pill twice daily for the next 6 months.  She will call and let us know how she is doing and otherwise follow-up as needed.      **This electronic medical record note was created with the use of voice recognition software.  Despite proofreading, typographical or grammatical errors may be present that could affect meaning of content **    Subjective   Patient ID:     April Dawkins is a 48 y.o. female who presents to the office today complaining of a persistent sore throat.  A few months ago she had biopsies of her arytenoids by a different ear nose and throat specialist and these came back negative for cancer but did reveal chronic inflammation.  She has been taking reflux medication on a daily basis.  She denies any true heartburn or choking on foods or liquids.  She denies any hemoptysis or hematemesis.  She does not smoke or drink alcohol heavily.  She does clear her throat frequently.    ROS:  A detailed 12 system review of systems is noted on the intake form has been reviewed with the patient with details noted in the HPI and scanned into the patient's medical record.    Objective     Past Medical History:   Diagnosis Date    Arthritis 2000    Coronary artery disease 10/16/2023    Elevated CAC April 2023 = 114 (all in LAD)    Transient ischemic attack (TIA) 10/16/2023    6/2022, then 12/2022 ? migraine vs TIA    Varicella 1978        Past Surgical History:   Procedure Laterality Date    ANKLE SURGERY  01/26/2016    Ankle Surgery    EYE SURGERY  2008    HYSTERECTOMY  March  2023    MR HEAD ANGIO WO IV CONTRAST  01/10/2023    MR HEAD ANGIO WO IV CONTRAST 1/10/2023 [facility]    MR NECK ANGIO WO IV CONTRAST  01/10/2023    MR NECK ANGIO WO IV CONTRAST 1/10/2023 [facility]CTOR [facility]        No Known Allergies       Current Outpatient Medications:     aspirin 81 mg EC tablet, Take 1 tablet (81 mg) by mouth once daily., Disp: , Rfl:     EnbreL Mini 50 mg/mL (1 mL) cartridge, Inject 1 mL (50 mg) under the skin 1 (one) time per week., Disp: , Rfl:     fluticasone (Flonase) 50 mcg/actuation nasal spray, Administer 2 sprays into each nostril once daily., Disp: , Rfl:     folic acid (Folvite) 1 mg tablet, once every 24 hours., Disp: , Rfl:     loratadine (Claritin) 10 mg tablet, Take 1 tablet (10 mg) by mouth once daily., Disp: , Rfl:     methotrexate (Trexall) 2.5 mg tablet, TAKE 8 TABLETS BY MOUTH ONE TIME PER WEEK, Disp: , Rfl:     traZODone (Desyrel) 50 mg tablet, Take 1 tablet (50 mg) by mouth once daily at bedtime., Disp: , Rfl:     valACYclovir (Valtrex) 500 mg tablet, Take 1 tablet (500 mg) by mouth once daily., Disp: , Rfl:     albuterol 90 mcg/actuation inhaler, Inhale 2 puffs every 6 hours if needed., Disp: , Rfl:     montelukast (Singulair) 10 mg tablet, Take 1 tablet (10 mg) by mouth once daily at bedtime. (Patient not taking: Reported on 7/18/2024), Disp: 30 tablet, Rfl: 5    omeprazole (PriLOSEC) 40 mg DR capsule, Take 1 capsule (40 mg) by mouth once daily in the morning. Take before meals. Do not crush or chew., Disp: 30 capsule, Rfl: 2    phentermine 37.5 mg capsule, Take 1 capsule (37.5 mg) by mouth once daily in the morning. Take before meals. Do not fill before May 29, 2024., Disp: 30 capsule, Rfl: 0    Ubrelvy 100 mg tablet tablet, TAKE 1 TABLET BY MOUTH AT ONSET OF MIGRAINE. MAY REPEAT IN 2 HOURS IF NO RELIEF., Disp: , Rfl:      Tobacco Use: Medium Risk (7/18/2024)    Patient History     Smoking Tobacco Use: Former     Smokeless Tobacco Use: Never     Passive  "Exposure: Not on file        Alcohol Use: Not on file        Social History     Substance and Sexual Activity   Drug Use Yes    Frequency: 3.0 times per week    Types: Marijuana    Comment: Edibles        Physical Exam:  Visit Vitals  /87   Temp 36.3 °C (97.3 °F) (Temporal)   Ht 1.626 m (5' 4\")   Wt 78.6 kg (173 lb 3.2 oz)   BMI 29.73 kg/m²   OB Status Hysterectomy   Smoking Status Former   BSA 1.88 m²      General: Patient is alert, oriented, cooperative in no apparent distress.  Head: Normocephalic, atraumatic.  Eyes: PERRL, EOMI, Conjunctiva is clear. No nystagmus.  Ears: Right Ear-- Pinna is normal.  External auditory canal is patent. Tympanic membrane is [intact, translucent and has good mobility with my pneumatic otoscope. No effusion].  Mastoid is nontender.  Left ear-- Pinna is normal.  External auditory canal is patent. Tympanic membrane is [intact, translucent and has good mobility with my pneumatic otoscope.  No effusion].  Mastoid is nontender.  Nose: Septum is mildly deviated to the left.  No septal perforation or lesions. No septal hematoma/ seroma.  No signs of bleeding.  Inferior turbinates are mildly swollen.   No evidence of intranasal polyps.  No infectious drainage.  Throat:  Floor of mouth is clear, no masses.  Tongue appears normal, no lesions or masses. Gums, gingiva, buccal mucosa appear pink and moist, no lesions. Teeth are in good repair.  No obvious dental infections.  Peritonsillar regions appear symmetric without swelling.  Hard and soft palate appear normal, no obvious cleft. Uvula is midline.  Oropharynx: No lesions. Retropharyngeal wall is flat.  No active postnasal drip.  Neck: Supple,  no lymphadenopathy.  No masses.  Salivary Glands: Symmetric bilaterally.  No palpable masses.  No evidence of acute infection or salivary stones  Neurologic: Cranial Nerves 2-12 are grossly intact without focal deficits. Cerebellar function testing is normal.     Results:   []    Procedure: "   Pre Op Diagnosis: Persistent sore throat with throat clearing-rule out upper airway mass  Post Op Diagnosis: Same  Procedure: Flexible Nasopharyngolaryngoscopy  Surgeon: Gregory Mendoza DO  Assist: None  Anesthesia: Topical Lidocaine 4%/ 0.05% Afrin 1:1 mixture  EBL: None  Complications: None  Disposition: The patient tolerated the procedure well. There were no complications.    Procedure:  After informed consent was obtained with the risks, benefits, complications and alternatives explained to the patient/ guardian, the patient was sat up in the ENT chair and the nose was anesthetized and decongested with topical  4% lidocaine and 0.05% Afrin. After allowing this to take effect, the flexible nasopharyngoscope was advanced thru the nostrils and down to the larynx. The following areas were visualized:  The nasal passages, septum, nasopharynx, sinus ostia, base of tongue, epiglottis, true and false vocal folds, arytenoids, post cricoid region and subglottis were all examined and found to be normal except as follows:  Septum has a spur to the left.  Ostiomeatal complexes are clear bilaterally.  No pus polyps or lesions.  Nasopharynx is clear.  No masses.  Base of tongue clear.  Epiglottis is normal.  Her arytenoids are mildly erythematous from her previous biopsy sites that appear to be healing.  No bleeding.  No mass.  She does have significant interarytenoid edema consistent with silent reflux/laryngopharyngeal reflux.  Subglottis is clear.      The patient tolerated the procedure well and there were no complications.      Gregory Mendoza DO

## 2024-07-22 ENCOUNTER — APPOINTMENT (OUTPATIENT)
Dept: OTOLARYNGOLOGY | Facility: CLINIC | Age: 49
End: 2024-07-22
Payer: COMMERCIAL

## 2024-07-25 NOTE — PROGRESS NOTES
OCCUPATIONAL THERAPY TREATMENT NOTE    Patient Name:  April Dawkins   Patient MRN: [id number]  Date: 2024  Time Calculation  Start Time: 1030  Stop Time: 1053  Time Calculation (min): 23 min    Insurance:  Visit number: 10 of 12  Insurance Type: Payor: GENERIC COMMERCIAL / Plan: GENERIC COMMERCIAL / Product Type: *No Product type* /   Authorization or Plan of Care date Range: 100v per madeline year   Copay: n/a  Referred by: Erlin Ambriz DO        OT Therapeutic Procedures Time Entry  Therapeutic Exercise Time Entry: 23                    **Patient requested to leave after objective measurements**    General:  Reason for visit: L thumb CMC arthroplasty   Referring provider: Erlin Ambriz DO     Assessment:  Progress towards functional goals: Improved mobility in L wrist/thumb, Improve  and pinch strength, Improved fine motor coordination and manual dexterity skills, Improved performance in daily activities , and Reduce pain level  Response to interventions:  Objective measurements were re-assessed indicating normal wrist/thumb AROM and increased /pinch strength. Improved QuickDASH score noted indicating increased participation in ADLs, IADLs, and work tasks. Patient has achieved 4/5 goals since last re-check on 24. Patient reports she has been performing all ADLs, IADLs, and work tasks normally but can not open a jar. Therapist recommended purchasing a silicone pad to assist in opening jars. D/t functional objective measurements and  thumb pain, patient will be discharged from OT services. Patient stated she believes she is ready for discharge and will continue HEP exercises to improve hand strength.    Justification for continued skilled care: To address remaining functional, objective and subjective deficits to allow them to return to full independence with ADLs. Modify and progress home exercise program. Skilled  intervention required to improve ROM which will improve function. Reduce swelling to improve joint ROM and reduce muscular inhibition.    Plan:  Discharge from occupational therapy    Therapy diagnoses:   1. Joint stiffness  Follow Up In Occupational Therapy      2. Unilateral primary osteoarthritis of first carpometacarpal joint, left hand  Follow Up In Occupational Therapy            Subjective:  April reports she feels like her condition is improving  Progress towards functional goal:   Patient reports she continues to have tightness in L thumb but has been completing all daily tasks normally. Notes soreness after work tasks but pain is manageable. Patient was discharged by orthopedic. Orthopedic stated it was up to patient's discretion if she would like to continue therapy.    Pain Location L thumb  Pain (0-10): 0   HEP adherence / understanding: compliance with the instructed home exercises.    Last surgery: Left Thumb Carpometacarpal Arthroplasty With Possible Tendon Transfer - Left  Date of last surgery: 4/10/2024  Days since last surgery: 110    Precautions:  Fall Risk: None    Precautions listed: thumb arthroplasty protocol     Objective:   Active range of motion:  L Wrist:  - Flexion: 75 (+5)  - Extension: 70 (no change)  - Rd dev: 20 (+10)  - Ul dev: 40 (+15)     L Thumb:  - CMC PABD: 55 (+5)  - CMC RABD: 50 (no change)  - MP ext: 0  - MP flex: 30 (-5)  - IP ext: 0  - IP flex: 55 (+10)      strength:  - RUE : 55# (-5#)  - LUE : 45# (+15#)      Pinch Strength:  - Lateral:       - RUE: 16# (+2#)        - LUE: 9# (+1#)  - 3 Jaw:       - RUE: 11# (-3#)        - LUE: 11# (+2#)     Outcome Measures:  OT Adult Other Outcome Measures:   9 Hole Peg Test: R: 15.5 secs  (1.8 sec decline)      L: 18.3 17.1 secs (1.2 secs decline)  OT Adult Other Outcome Measures  Other Outcome Measures: 16 (10 point improvement)  (QuickDASH)  (11.36%)    Goals as of 06/03/24:  1) Patient to increase Thumb MP flexion AROM  "by 10 degrees in order to improve independent performance in daily activities. Progressing   2) Patient will improve L  strength by 10lb to improve performance in lifting and grasping tasks. Met  3) Patient's scar will be supple and demonstrate good healing that does not limit function by discharge. Met  4) Patient to improve QuickDASH score to at or below 20% to increase independency in ADLs and IADLs. Met  5) Patient to increase wrist radial deviation AROM by 10 degrees in order to improve independent performance in daily activities. Met    Treatment Performed: (\"NP\" = Not Performed)     Therapeutic Exercise: 23 mins  - Re-assessed objective measurements  - Discussed goal achievement and discharge  - Reviewed HEP exercises     Therapeutic Activities: NP     Manual Therapy: NP    Neuromuscular Re-Education: NP  -   Modalities: NP    Education: Reviewed home exercise program.   "

## 2024-07-29 ENCOUNTER — TREATMENT (OUTPATIENT)
Dept: OCCUPATIONAL THERAPY | Facility: CLINIC | Age: 49
End: 2024-07-29
Payer: COMMERCIAL

## 2024-07-29 DIAGNOSIS — M18.12 UNILATERAL PRIMARY OSTEOARTHRITIS OF FIRST CARPOMETACARPAL JOINT, LEFT HAND: ICD-10-CM

## 2024-07-29 DIAGNOSIS — M25.60 JOINT STIFFNESS: ICD-10-CM

## 2024-07-29 PROCEDURE — 97110 THERAPEUTIC EXERCISES: CPT | Mod: GO

## 2024-07-29 ASSESSMENT — PAIN SCALES - GENERAL: PAINLEVEL_OUTOF10: 0 - NO PAIN

## 2024-07-29 ASSESSMENT — PAIN - FUNCTIONAL ASSESSMENT: PAIN_FUNCTIONAL_ASSESSMENT: 0-10

## 2024-08-05 ENCOUNTER — APPOINTMENT (OUTPATIENT)
Dept: OCCUPATIONAL THERAPY | Facility: CLINIC | Age: 49
End: 2024-08-05
Payer: COMMERCIAL

## 2024-08-12 ENCOUNTER — APPOINTMENT (OUTPATIENT)
Dept: OCCUPATIONAL THERAPY | Facility: CLINIC | Age: 49
End: 2024-08-12
Payer: COMMERCIAL

## 2024-11-23 ENCOUNTER — HOSPITAL ENCOUNTER (OUTPATIENT)
Dept: RADIOLOGY | Facility: CLINIC | Age: 49
Discharge: HOME | End: 2024-11-23
Payer: COMMERCIAL

## 2024-11-23 DIAGNOSIS — R10.9 UNSPECIFIED ABDOMINAL PAIN: ICD-10-CM

## 2024-11-23 PROCEDURE — 74176 CT ABD & PELVIS W/O CONTRAST: CPT

## 2024-11-23 PROCEDURE — 74176 CT ABD & PELVIS W/O CONTRAST: CPT | Performed by: RADIOLOGY

## 2024-12-09 ENCOUNTER — APPOINTMENT (OUTPATIENT)
Dept: RADIOLOGY | Facility: HOSPITAL | Age: 49
End: 2024-12-09
Payer: COMMERCIAL

## (undated) DEVICE — SOLUTION, IRRIGATION, STERILE WATER, 1000 ML, POUR BOTTLE

## (undated) DEVICE — SUTURE, VICRYL, 0, 27 IN, CT-2, UNDYED

## (undated) DEVICE — APPLICATOR, CHLORAPREP, W/ORANGE TINT, 26ML

## (undated) DEVICE — SUTURE, CTD, VICRYL, 2-0, UND, BR, CT-2

## (undated) DEVICE — DRESSING, NON-ADHERENT, 3 X 3 IN, STERILE

## (undated) DEVICE — GLOVE, SURGICAL, PROTEXIS PI MICRO, 7.5, PF, LF

## (undated) DEVICE — PENCIL, ELECTROSURGICAL, HAND CONTROL

## (undated) DEVICE — SUTURE, ETHIBOND, 2-0, 30 IN SH, GREEN BR

## (undated) DEVICE — COVER, EQUIPMENT, C ARM, W/ STRAPS

## (undated) DEVICE — TOWEL PACK, STERILE, 4/PACK, BLUE

## (undated) DEVICE — CAUTERY, PENCIL, PUSH BUTTON, SMOKE EVAC, 70MM

## (undated) DEVICE — GLOVE, SURGICAL, PROTEXIS PI MICRO, 7.0, PF, LF

## (undated) DEVICE — BANDAGE, ELASTIC, PREMIUM, SELF-CLOSE, 4 IN X 5.5 YD, STERILE

## (undated) DEVICE — CUFF, TOURNIQUET, 18 X 5.5, DISP, LF

## (undated) DEVICE — DRAPE, SHEET, 17 X 23 IN

## (undated) DEVICE — PADDING, UNDERCAST, WEBRIL, 4 IN X 4 YD, REG, NS

## (undated) DEVICE — SUTURE, ETHILON, 4-0, BLK, MONO, PS-2 18

## (undated) DEVICE — DRESSING, GAUZE, SUPER KERLIX, 6X6

## (undated) DEVICE — Device

## (undated) DEVICE — SOLUTION, IRRIGATION, SODIUM CHLORIDE 0.9%, 1000 ML, POUR BOTTLE

## (undated) DEVICE — GLOVE, SURGICAL, PROTEXIS PI , 7.5, PF, LF